# Patient Record
Sex: FEMALE | Race: WHITE | Employment: OTHER | URBAN - NONMETROPOLITAN AREA
[De-identification: names, ages, dates, MRNs, and addresses within clinical notes are randomized per-mention and may not be internally consistent; named-entity substitution may affect disease eponyms.]

---

## 2017-01-10 ENCOUNTER — PATIENT OUTREACH (OUTPATIENT)
Dept: FAMILY MEDICINE CLINIC | Facility: CLINIC | Age: 82
End: 2017-01-10

## 2017-01-16 ENCOUNTER — TELEPHONE (OUTPATIENT)
Dept: FAMILY MEDICINE CLINIC | Facility: CLINIC | Age: 82
End: 2017-01-16

## 2017-01-16 NOTE — TELEPHONE ENCOUNTER
Pt has not c/o low back pain since 12/21/2017. Melecio Sanchez said to cancel the Ua & C&S order.   ej/cj

## 2017-01-25 NOTE — TELEPHONE ENCOUNTER
Outside script request for Lozepam. Per [facility], hand written script for controlled substances need to be provided  Last filled: 11/26/2016 #6 w/ 5RF

## 2017-03-27 ENCOUNTER — MED REC SCAN ONLY (OUTPATIENT)
Dept: FAMILY MEDICINE CLINIC | Facility: CLINIC | Age: 82
End: 2017-03-27

## 2017-04-19 RX ORDER — LORAZEPAM 0.5 MG/1
TABLET ORAL
Qty: 122 TABLET | Refills: 2 | Status: SHIPPED
Start: 2017-04-19 | End: 2017-04-20

## 2017-04-20 ENCOUNTER — TELEPHONE (OUTPATIENT)
Dept: FAMILY MEDICINE CLINIC | Facility: CLINIC | Age: 82
End: 2017-04-20

## 2017-04-20 RX ORDER — LORAZEPAM 0.5 MG/1
TABLET ORAL
Qty: 120 TABLET | Refills: 0 | Status: SHIPPED
Start: 2017-04-20 | End: 2017-06-01

## 2017-04-24 ENCOUNTER — TELEPHONE (OUTPATIENT)
Dept: FAMILY MEDICINE CLINIC | Facility: CLINIC | Age: 82
End: 2017-04-24

## 2017-04-25 ENCOUNTER — MED REC SCAN ONLY (OUTPATIENT)
Dept: FAMILY MEDICINE CLINIC | Facility: CLINIC | Age: 82
End: 2017-04-25

## 2017-04-28 ENCOUNTER — TELEPHONE (OUTPATIENT)
Dept: FAMILY MEDICINE CLINIC | Facility: CLINIC | Age: 82
End: 2017-04-28

## 2017-04-28 DIAGNOSIS — E78.5 HYPERLIPIDEMIA, UNSPECIFIED HYPERLIPIDEMIA TYPE: ICD-10-CM

## 2017-04-28 DIAGNOSIS — I10 ESSENTIAL HYPERTENSION: Primary | ICD-10-CM

## 2017-05-23 ENCOUNTER — TELEPHONE (OUTPATIENT)
Dept: FAMILY MEDICINE CLINIC | Facility: CLINIC | Age: 82
End: 2017-05-23

## 2017-05-23 NOTE — TELEPHONE ENCOUNTER
Likely progression of dementia. Can do trial off Lorazepam.  If that doesn't work, need to look into long-term care facilities. V/o Dr. Sandra Hughes. Enrico Marking understanding.   States she is pulling in there now as they believe Cedric Herrera may be havin

## 2017-05-24 NOTE — TELEPHONE ENCOUNTER
Advised Kayleigh that per Dr. Aparna Leo: ok to do a trial hold of the lorazepam at night.  Kayleigh verbalized understanding of the information provided

## 2017-05-25 NOTE — TELEPHONE ENCOUNTER
After discussion with Dr Jessica Ferguson- has decided to change seroquel to 25 mg po BID. Call with update in 1 week regarding episodes of wandering, confusion,agitation. May give the HS dose of Lorazepam if needed for one week; then update to Dr Jessica Ferguson.

## 2017-05-25 NOTE — TELEPHONE ENCOUNTER
Below orders called to [place] at [facility]. Have called and left message for daughter to call office to update her as to med change.

## 2017-05-25 NOTE — TELEPHONE ENCOUNTER
Anne You at [facility] reports last night daughter requested the loreazepam be given due to agitated episode

## 2017-05-25 NOTE — TELEPHONE ENCOUNTER
Daughter calls back and reports Tuesday night was great w/o the lorazepam; but last night was not good- very distraught, ringing her hands, upset, scared, told her daughter [facility]. That's why daughter wanted lorazepam given.

## 2017-06-01 ENCOUNTER — TELEPHONE (OUTPATIENT)
Dept: FAMILY MEDICINE CLINIC | Facility: CLINIC | Age: 82
End: 2017-06-01

## 2017-06-01 RX ORDER — LORAZEPAM 0.5 MG/1
TABLET ORAL
Qty: 120 TABLET | Refills: 0 | COMMUNITY
Start: 2017-06-01 | End: 2017-12-05

## 2017-06-01 NOTE — TELEPHONE ENCOUNTER
Calls with update- since seraquel being changed to BID dosing, she has had only 1 afternoon episode of wandering on 5/28/2017. No episodes of agitation; but is falling asleep more during the day.    Currently taking lorazepam 1 mg in AM, and HS dose is PRN

## 2017-06-08 ENCOUNTER — TELEPHONE (OUTPATIENT)
Dept: FAMILY MEDICINE CLINIC | Facility: CLINIC | Age: 82
End: 2017-06-08

## 2017-06-08 NOTE — TELEPHONE ENCOUNTER
Nurse reports Mor Ashford is less sedated since making loreazepam PRN. They only gave it at HS on 6/4/2017 and 6/5/2017; 1mg. Will update Dr Angelique Ocampo. To call if any changes in behavior/agitation.

## 2017-06-15 ENCOUNTER — MED REC SCAN ONLY (OUTPATIENT)
Dept: FAMILY MEDICINE CLINIC | Facility: CLINIC | Age: 82
End: 2017-06-15

## 2017-06-30 DIAGNOSIS — F02.81 ALZHEIMER'S DEMENTIA WITH BEHAVIORAL DISTURBANCE, UNSPECIFIED TIMING OF DEMENTIA ONSET (HCC): ICD-10-CM

## 2017-06-30 DIAGNOSIS — G30.9 ALZHEIMER'S DEMENTIA WITH BEHAVIORAL DISTURBANCE, UNSPECIFIED TIMING OF DEMENTIA ONSET (HCC): ICD-10-CM

## 2017-06-30 RX ORDER — QUETIAPINE 25 MG/1
TABLET, FILM COATED ORAL
Qty: 28 TABLET | Refills: 11 | Status: SHIPPED | OUTPATIENT
Start: 2017-06-30 | End: 2020-12-01

## 2017-08-17 ENCOUNTER — TELEPHONE (OUTPATIENT)
Dept: FAMILY MEDICINE CLINIC | Facility: CLINIC | Age: 82
End: 2017-08-17

## 2017-08-17 NOTE — TELEPHONE ENCOUNTER
NEEDS TO BE TRANSFERRED FROM [facility]ISTED LIVING TO [facility]. BUT NEEDS H&P DONE PRIOR TO TRANSFER.  DOES SHE NEED TO BE SEEN? LAST OFFICE VISIT WAS 12/2016

## 2017-08-21 PROBLEM — F32.4 MAJOR DEPRESSION IN PARTIAL REMISSION (HCC): Chronic | Status: ACTIVE | Noted: 2017-08-21

## 2017-08-21 NOTE — PROGRESS NOTES
HPI:   Chelly Coleman is a 81year old female who presents for a MA (Medicare Advantage) Supervisit (Once per calendar year).     Pt going into memory unit NH  Her last annual assessment has been over 1 year: Annual Physical due on 04/30/2017         Rich mouth daily. aspirin 81 MG Oral Tab EC Take 1 tablet (81 mg total) by mouth daily. Colestipol HCl 1 G Oral Tab Take 1 tablet (1 g total) by mouth 2 (two) times daily. Memantine HCl 5 MG Oral Tab Take 1 tablet (5 mg total) by mouth nightly.    omeprazo discharge or itching, no complaint of urinary incontinence   MUSCULOSKELETAL: denies back pain  NEURO: denies headaches  PSYCHE: denies depression or anxiety  HEMATOLOGIC: denies hx of anemia  ENDOCRINE: denies thyroid history  ALL/ASTHMA: denies hx of all Pneumococcal, Zoster, Tetanus     Immunization History   Administered Date(s) Administered   • Influenza Vaccine, High Dose, Preserv Free 09/27/2015, 10/20/2016   • Pneumococcal (Prevnar 13) 04/30/2016       ASSESSMENT AND OTHER RELEVANT CHRONIC CONDITIONS assessment: good     Advanced Directive:  Living Will on file in [facility]? Chelly Coleman does not have a Living Will on file in [facility]  The patient has this document but we do not have it in [facility], and patient is instructed to get our office a copy of it for s accidently lose urine?: 0-No    Do you have difficulty seeing?: 0-No    Do you have any difficulty walking or getting up?: 0-No    Do you have any tripping hazards?: 0-No    Are you on multiple medications?: 1-Yes    Does pain affect your day to day Gal[facility] Screening      Ophthalmology Visit Annually: Diabetics, FHx Glaucoma, AA>50, > 65 Data entered on: 1/16/2016   Last Dilated Eye Exam 12/30/2015       Bone Density Screening      Dexascan Every two years No results found for this or any previous vis diuretics, anticonvulsants.)    Potassium  Annually Potassium (mmol/L)   Date Value   04/30/2016 4.4    No flowsheet data found. Creatinine  Annually Creatinine (mg/dL)   Date Value   04/30/2016 1.21 (H)    No flowsheet data found.     BUN  Annually BUN

## 2017-08-21 NOTE — PATIENT INSTRUCTIONS
Tiffany Pedroza's SCREENING SCHEDULE   Tests on this list are recommended by your physician but may not be covered, or covered at this frequency, by your insurer. Please check with your insurance carrier before scheduling to verify coverage.    PREVENTATI often if abnormal There are no preventive care reminders to display for this patient. Update Health Maintenance if applicable    Flex Sigmoidoscopy Screen  Covered every 5 years No results found for this or any previous visit. No flowsheet data found. KAREN IM    Please get once after your 65th birthday    Pneumococcal 23 (Pneumovax)  Covered Once after 65 No orders found for this or any previous visit.  Please get once after your 65th birthday    Hepatitis B for Moderate/High Risk       No orders found fo

## 2017-09-05 ENCOUNTER — MED REC SCAN ONLY (OUTPATIENT)
Dept: FAMILY MEDICINE CLINIC | Facility: CLINIC | Age: 82
End: 2017-09-05

## 2017-10-16 ENCOUNTER — MED REC SCAN ONLY (OUTPATIENT)
Dept: FAMILY MEDICINE CLINIC | Facility: CLINIC | Age: 82
End: 2017-10-16

## 2017-11-02 ENCOUNTER — PATIENT OUTREACH (OUTPATIENT)
Dept: FAMILY MEDICINE CLINIC | Facility: CLINIC | Age: 82
End: 2017-11-02

## 2017-12-01 NOTE — TELEPHONE ENCOUNTER
Ken Alvarado Nurse   Caller: Jocy Early at [place] at [facility] (Today,  2:59 PM)      [address]   [phone]      Fax request received from [facility] requesting refill for Lorazepam.  Need to confirm who is attendin

## 2017-12-05 RX ORDER — LORAZEPAM 0.5 MG/1
TABLET ORAL
Qty: 60 TABLET | Refills: 0 | Status: SHIPPED
Start: 2017-12-05 | End: 2018-01-19

## 2017-12-05 NOTE — TELEPHONE ENCOUNTER
Refaxed [facility] form for Lorazepam 0.5 mg tab script. Due to strength of tab being not on form.   Routed to Dr Angelique Ocampo to sign lorazepam script

## 2017-12-06 ENCOUNTER — MED REC SCAN ONLY (OUTPATIENT)
Dept: FAMILY MEDICINE CLINIC | Facility: CLINIC | Age: 82
End: 2017-12-06

## 2017-12-09 ENCOUNTER — OFFICE VISIT (OUTPATIENT)
Dept: FAMILY MEDICINE CLINIC | Facility: CLINIC | Age: 82
End: 2017-12-09

## 2017-12-09 VITALS
WEIGHT: 157.38 LBS | HEART RATE: 81 BPM | OXYGEN SATURATION: 97 % | BODY MASS INDEX: 29 KG/M2 | SYSTOLIC BLOOD PRESSURE: 120 MMHG | DIASTOLIC BLOOD PRESSURE: 70 MMHG | TEMPERATURE: 98 F

## 2017-12-09 DIAGNOSIS — I10 ESSENTIAL HYPERTENSION WITH GOAL BLOOD PRESSURE LESS THAN 140/90: ICD-10-CM

## 2017-12-09 DIAGNOSIS — N18.30 CKD (CHRONIC KIDNEY DISEASE) STAGE 3, GFR 30-59 ML/MIN (HCC): ICD-10-CM

## 2017-12-09 DIAGNOSIS — K21.9 GASTROESOPHAGEAL REFLUX DISEASE WITHOUT ESOPHAGITIS: ICD-10-CM

## 2017-12-09 DIAGNOSIS — G30.8 ALZHEIMER'S DISEASE OF OTHER ONSET WITHOUT BEHAVIORAL DISTURBANCE: ICD-10-CM

## 2017-12-09 DIAGNOSIS — E55.9 VITAMIN D DEFICIENCY: ICD-10-CM

## 2017-12-09 DIAGNOSIS — I87.2 VENOUS INSUFFICIENCY OF BOTH LOWER EXTREMITIES: ICD-10-CM

## 2017-12-09 DIAGNOSIS — I77.9 BILATERAL CAROTID ARTERY DISEASE (HCC): ICD-10-CM

## 2017-12-09 DIAGNOSIS — F02.80 ALZHEIMER'S DISEASE OF OTHER ONSET WITHOUT BEHAVIORAL DISTURBANCE: ICD-10-CM

## 2017-12-09 DIAGNOSIS — D51.0 ANEMIA, PERNICIOUS: Primary | ICD-10-CM

## 2017-12-09 DIAGNOSIS — I10 ESSENTIAL HYPERTENSION: ICD-10-CM

## 2017-12-09 DIAGNOSIS — E78.5 HYPERLIPIDEMIA, UNSPECIFIED HYPERLIPIDEMIA TYPE: ICD-10-CM

## 2017-12-09 DIAGNOSIS — R41.3 MEMORY LOSS OF UNKNOWN CAUSE: ICD-10-CM

## 2017-12-09 PROCEDURE — 82607 VITAMIN B-12: CPT | Performed by: FAMILY MEDICINE

## 2017-12-09 PROCEDURE — 80053 COMPREHEN METABOLIC PANEL: CPT | Performed by: FAMILY MEDICINE

## 2017-12-09 PROCEDURE — 36415 COLL VENOUS BLD VENIPUNCTURE: CPT | Performed by: FAMILY MEDICINE

## 2017-12-09 PROCEDURE — 99214 OFFICE O/P EST MOD 30 MIN: CPT | Performed by: FAMILY MEDICINE

## 2017-12-09 PROCEDURE — 85025 COMPLETE CBC W/AUTO DIFF WBC: CPT | Performed by: FAMILY MEDICINE

## 2017-12-09 PROCEDURE — 82746 ASSAY OF FOLIC ACID SERUM: CPT | Performed by: FAMILY MEDICINE

## 2017-12-09 PROCEDURE — 82306 VITAMIN D 25 HYDROXY: CPT | Performed by: FAMILY MEDICINE

## 2017-12-09 RX ORDER — AMLODIPINE BESYLATE 2.5 MG/1
2.5 TABLET ORAL DAILY
COMMUNITY
Start: 2016-06-18 | End: 2020-10-01

## 2017-12-09 RX ORDER — MEMANTINE HYDROCHLORIDE 5 MG/1
1 TABLET ORAL NIGHTLY
COMMUNITY
Start: 2015-10-02 | End: 2020-08-27

## 2017-12-09 RX ORDER — FUROSEMIDE 40 MG/1
TABLET ORAL
Qty: 30 TABLET | Refills: 3 | COMMUNITY
Start: 2017-12-09 | End: 2020-09-29

## 2017-12-09 NOTE — PATIENT INSTRUCTIONS
Medications as directed. Elevate legs. Massage feet and lower legs. Continue current medications. Call with questions or problems. Recheck basic metabolic panel in 2 weeks.

## 2017-12-09 NOTE — PROGRESS NOTES
HPI:    Patient ID: Cassandra Griffith is a 81year old female. Daughter concerned with swelling to lower extremities left greater than right. Not giving water pill at nursing home. Without problems with medications. Positive weight gain. No TATY hose.   Venida Lamp 500 MG Oral Tab Take 1 tablet (500 mg total) by mouth every 4 (four) hours as needed for Pain.  Disp: 100 tablet Rfl: 2     Allergies:  Adhesive Tape               Comment:AND WOULD DRESSINGS  Compazine [Prochlor*      Dihydroergotamine         Imitrex [Sum supplementation. Await laboratory studies.  - COMP METABOLIC PANEL (14); Future  - CBC WITH DIFFERENTIAL WITH PLATELET;  Future  - VITAMIN D, 25-HYDROXY; Future  - COMP METABOLIC PANEL (14)  - CBC WITH DIFFERENTIAL WITH PLATELET  - VITAMIN D, 25-HYDROXY  - D, 25-Hydroxy [E]      Vitamin G01 [E]      Folic Acid Serum [E]    Meds This Visit:  No prescriptions requested or ordered in this encounter       Imaging & Referrals:  None       IV#[id number]

## 2017-12-12 DIAGNOSIS — D51.0 ANEMIA, PERNICIOUS: ICD-10-CM

## 2017-12-12 DIAGNOSIS — E55.9 VITAMIN D DEFICIENCY: Primary | ICD-10-CM

## 2017-12-12 RX ORDER — FOLIC ACID 1 MG/1
1 TABLET ORAL DAILY
Qty: 90 TABLET | Refills: 3 | COMMUNITY
Start: 2017-12-12 | End: 2018-05-29 | Stop reason: ALTCHOICE

## 2017-12-12 RX ORDER — CHOLECALCIFEROL (VITAMIN D3) 125 MCG
1 CAPSULE ORAL DAILY
Qty: 90 TABLET | Refills: 3 | COMMUNITY
Start: 2017-12-12 | End: 2020-09-29

## 2017-12-12 RX ORDER — CHOLECALCIFEROL (VITAMIN D3) 125 MCG
500 CAPSULE ORAL DAILY
Qty: 90 TABLET | Refills: 3 | COMMUNITY
Start: 2017-12-12 | End: 2019-10-15

## 2018-01-03 ENCOUNTER — TELEPHONE (OUTPATIENT)
Dept: FAMILY MEDICINE CLINIC | Facility: CLINIC | Age: 83
End: 2018-01-03

## 2018-01-05 NOTE — TELEPHONE ENCOUNTER
REGARDING REFILL CYANOCOBALAMIN 1000 MCG/ML. RESIDENT REFUSED LAST INJECTION, AND IS CURRENTLY ON VITAMIN B12 500 MCG TAB DAILY. CAN WE D/C THEMONTHLY INJECTION? ADVISED- ORDER FAXED MAY D/C B12 INJECTION.   EJ/CJ

## 2018-01-10 ENCOUNTER — TELEPHONE (OUTPATIENT)
Dept: FAMILY MEDICINE CLINIC | Facility: CLINIC | Age: 83
End: 2018-01-10

## 2018-01-10 NOTE — TELEPHONE ENCOUNTER
Looking for an order for nose swab and if negative is there any kind of prophylactic needed? If Positive, tamaflu?  Please fax order to [phone]

## 2018-01-10 NOTE — TELEPHONE ENCOUNTER
Ayaka Ha called stating that patient started with fatigue and low grade fever yesterday. Temperature 99.4. Decrease in appetite. No cough or respiratory distress. Nurse is asking if a flu swab should be ordered.   There has been an outbreak of flu in the nu

## 2018-01-10 NOTE — TELEPHONE ENCOUNTER
Ok per Dr. Alisia Lobo to order flu swab. If positive, tamiflu 75mg BID x 5 days. If negative and patient wishes to do prophylatic, tamiflu 75mg daily x 10 days. Left message for Keen IO to return phone call.

## 2018-01-12 ENCOUNTER — PATIENT OUTREACH (OUTPATIENT)
Dept: FAMILY MEDICINE CLINIC | Facility: CLINIC | Age: 83
End: 2018-01-12

## 2018-01-13 ENCOUNTER — TELEPHONE (OUTPATIENT)
Dept: FAMILY MEDICINE CLINIC | Facility: CLINIC | Age: 83
End: 2018-01-13

## 2018-01-13 DIAGNOSIS — I10 ESSENTIAL HYPERTENSION: Primary | ICD-10-CM

## 2018-01-13 DIAGNOSIS — T50.2X5D DIURETICS CAUSING ADVERSE EFFECT IN THERAPEUTIC USE, SUBSEQUENT ENCOUNTER: ICD-10-CM

## 2018-01-13 NOTE — TELEPHONE ENCOUNTER
ROBIN WANTS TO MAKE SURE THERE IS AN ORDER TO RECHECK KIDNEY FUNCTION. ALSO, [facility]   PLEASE CALL [employer] CELL PHONE,  IF MONDAY, REST OF THE WEEK, PLEASE CALL WORK #

## 2018-01-15 ENCOUNTER — TELEPHONE (OUTPATIENT)
Dept: FAMILY MEDICINE CLINIC | Facility: CLINIC | Age: 83
End: 2018-01-15

## 2018-01-15 NOTE — TELEPHONE ENCOUNTER
DEC 9 LAST LABS; NEXT ORDERED FOR April; DO YOU WANT SOONER?    UPON FURTHER REVIEW OF CHART BMP WAS DUE BEGINNING OF JAN; FAX ORDER TO FACILITY; OK?

## 2018-01-16 RX ORDER — DEXTROMETHORPHAN POLISTIREX 30 MG/5ML
5 SUSPENSION ORAL EVERY 12 HOURS PRN
Refills: 0 | COMMUNITY
Start: 2018-01-16 | End: 2018-07-17 | Stop reason: ALTCHOICE

## 2018-01-16 NOTE — TELEPHONE ENCOUNTER
Resident is currently taking Tamiflu. Has occasional harsh non-productive cough. Can we have something PRN for cough/congestion?   Advised- Faxed back order for Delsym 1 tsp po q 12 hrs PRN   ej/cj

## 2018-01-19 RX ORDER — LORAZEPAM 0.5 MG/1
TABLET ORAL
Qty: 60 TABLET | Refills: 0 | Status: SHIPPED
Start: 2018-01-19 | End: 2018-08-27

## 2018-02-01 ENCOUNTER — PATIENT OUTREACH (OUTPATIENT)
Dept: FAMILY MEDICINE CLINIC | Facility: CLINIC | Age: 83
End: 2018-02-01

## 2018-03-12 ENCOUNTER — TELEPHONE (OUTPATIENT)
Dept: FAMILY MEDICINE CLINIC | Facility: CLINIC | Age: 83
End: 2018-03-12

## 2018-03-12 NOTE — TELEPHONE ENCOUNTER
Per Josephine Fonseca- they didn't receive BMP order for in Jan; they will get drawn tomorrow morning. Daughter gave her one of her zofran yesterday for nausea and it helped. Asking for prn order.       She currently is on lasix 40 mg q AM.  It has been increased to B

## 2018-03-12 NOTE — TELEPHONE ENCOUNTER
PER EMMANUEL NAUSEA WAS JUST YESTERDAY- SHE DID VOMIT TWICE. TODAY NO COMPLAINTS.   SHE WEARS NO SUPPORT STOCKINGS

## 2018-03-12 NOTE — TELEPHONE ENCOUNTER
Question support stockings daily. Question how long has nausea been an issue. Question related to medications.

## 2018-03-13 ENCOUNTER — TELEPHONE (OUTPATIENT)
Dept: FAMILY MEDICINE CLINIC | Facility: CLINIC | Age: 83
End: 2018-03-13

## 2018-05-26 ENCOUNTER — OFFICE VISIT (OUTPATIENT)
Dept: FAMILY MEDICINE CLINIC | Facility: CLINIC | Age: 83
End: 2018-05-26

## 2018-05-26 VITALS
TEMPERATURE: 100 F | HEART RATE: 76 BPM | DIASTOLIC BLOOD PRESSURE: 80 MMHG | BODY MASS INDEX: 28.04 KG/M2 | OXYGEN SATURATION: 96 % | WEIGHT: 152.38 LBS | HEIGHT: 62 IN | SYSTOLIC BLOOD PRESSURE: 134 MMHG

## 2018-05-26 DIAGNOSIS — I77.9 BILATERAL CAROTID ARTERY DISEASE (HCC): ICD-10-CM

## 2018-05-26 DIAGNOSIS — F32.4 MAJOR DEPRESSIVE DISORDER WITH SINGLE EPISODE, IN PARTIAL REMISSION (HCC): ICD-10-CM

## 2018-05-26 DIAGNOSIS — F02.80 ALZHEIMER'S DISEASE OF OTHER ONSET WITHOUT BEHAVIORAL DISTURBANCE: ICD-10-CM

## 2018-05-26 DIAGNOSIS — I10 ESSENTIAL HYPERTENSION: ICD-10-CM

## 2018-05-26 DIAGNOSIS — I87.2 VENOUS INSUFFICIENCY OF BOTH LOWER EXTREMITIES: ICD-10-CM

## 2018-05-26 DIAGNOSIS — E55.9 VITAMIN D DEFICIENCY: ICD-10-CM

## 2018-05-26 DIAGNOSIS — D51.0 ANEMIA, PERNICIOUS: ICD-10-CM

## 2018-05-26 DIAGNOSIS — E78.5 HYPERLIPIDEMIA, UNSPECIFIED HYPERLIPIDEMIA TYPE: ICD-10-CM

## 2018-05-26 DIAGNOSIS — Z00.00 ENCOUNTER FOR ANNUAL HEALTH EXAMINATION: ICD-10-CM

## 2018-05-26 DIAGNOSIS — G30.8 ALZHEIMER'S DISEASE OF OTHER ONSET WITHOUT BEHAVIORAL DISTURBANCE: ICD-10-CM

## 2018-05-26 DIAGNOSIS — K21.9 GASTROESOPHAGEAL REFLUX DISEASE, ESOPHAGITIS PRESENCE NOT SPECIFIED: Primary | ICD-10-CM

## 2018-05-26 PROCEDURE — 82306 VITAMIN D 25 HYDROXY: CPT | Performed by: FAMILY MEDICINE

## 2018-05-26 PROCEDURE — 82746 ASSAY OF FOLIC ACID SERUM: CPT | Performed by: FAMILY MEDICINE

## 2018-05-26 PROCEDURE — G0439 PPPS, SUBSEQ VISIT: HCPCS | Performed by: FAMILY MEDICINE

## 2018-05-26 PROCEDURE — 80053 COMPREHEN METABOLIC PANEL: CPT | Performed by: FAMILY MEDICINE

## 2018-05-26 PROCEDURE — 82607 VITAMIN B-12: CPT | Performed by: FAMILY MEDICINE

## 2018-05-26 PROCEDURE — 96160 PT-FOCUSED HLTH RISK ASSMT: CPT | Performed by: FAMILY MEDICINE

## 2018-05-26 PROCEDURE — 99397 PER PM REEVAL EST PAT 65+ YR: CPT | Performed by: FAMILY MEDICINE

## 2018-05-26 PROCEDURE — 85025 COMPLETE CBC W/AUTO DIFF WBC: CPT | Performed by: FAMILY MEDICINE

## 2018-05-26 NOTE — PATIENT INSTRUCTIONS
Soak feet q d x 5 min  meds as directed  vicks q hs 1st nail  Reg exercise  CCC    Rody Pedroza's SCREENING SCHEDULE   Tests on this list are recommended by your physician but may not be covered, or covered at this frequency, by your insurer.  Please sean Screening  Covered up to Age 76     Colonoscopy Screen   Covered every 10 years- more often if abnormal There are no preventive care reminders to display for this patient.  Update Health Maintenance if applicable    Flex Sigmoidoscopy Screen  Covered every Pneumococcal 13 (Prevnar)  Covered Once after 65   Orders placed or performed in visit on 04/30/16  -PNEUMOCOCCAL VACC, 13 KAREN IM    Please get once after your 65th birthday    Pneumococcal 23 (Pneumovax)  Covered Once after 65 No orders found for this or

## 2018-05-26 NOTE — PROGRESS NOTES
HPI:   Elis Ivy is a 81year old female who presents for a MA (Medicare Advantage) Supervisit (Once per calendar year).       Annual Physical due on 08/21/2018        Fall/Risk Assessment abnormal    She has been screened for Falls and is High Risk: for groceries: Cannot do without help   She has difficulties Taking Meds as Rx'd based on screening of functional status. Taking medications as prescribed: Cannot do without help      She has Hearing problems based on screening of functional status.    He 04/30/2016          Last Chemistry Labs:     Lab Results  Component Value Date   AST 11 (L) 12/09/2017   ALT 16 12/09/2017   CA 9.0 12/09/2017   ALB 3.2 (L) 12/09/2017   TSH 2.330 11/28/2014   CREATSERUM 1.18 (H) 12/09/2017    (H) 12/09/2017 daily. PRN for allergy symptoms   Memantine HCl (NAMENDA) 10 MG Oral Tab Take 1 tablet (10 mg total) by mouth every morning.    acetaminophen (TYLENOL EX ST ARTHRITIS PAIN) 500 MG Oral Tab Take 1 tablet (500 mg total) by mouth every 4 (four) hours as needed Normocephalic, without obvious abnormality, atraumatic   Eyes:  PERRL, conjunctiva/corneas clear, EOM's intact both eyes   Ears:  Normal TM's and external ear canals, both ears   Nose: Nares normal, septum midline,mucosa normal, no drainage or sinus tender FOLIC ACID SERUM(FOLATE)  -     VITAMIN B12  -     VITAMIN D, 25-HYDROXY  -     CBC W/ DIFFERENTIAL    Major depressive disorder with single episode, in partial remission ([facility] [address]    Bilateral carotid artery disease (HCC)    Venous insufficiency of both lower In the past six months, have you lost more than 10 pounds without trying?: 2 - No  Has your appetite been poor?: No (lower appetite)  How does the patient maintain a good energy level?: Stretching; Other (sleeping poor)  How would you describe your daily Chlamydia  Annually if high risk No results found for: CHLAMYDIA No flowsheet data found. Screening Mammogram      Mammogram Annually to 76, then as discussed There are no preventive care reminders to display for this patient.  Update [facility]

## 2018-05-29 DIAGNOSIS — E78.5 HYPERLIPIDEMIA, UNSPECIFIED HYPERLIPIDEMIA TYPE: ICD-10-CM

## 2018-05-29 DIAGNOSIS — D51.0 ANEMIA, PERNICIOUS: Primary | ICD-10-CM

## 2018-05-29 DIAGNOSIS — I10 ESSENTIAL HYPERTENSION: ICD-10-CM

## 2018-05-29 DIAGNOSIS — E55.9 VITAMIN D DEFICIENCY: ICD-10-CM

## 2018-07-07 ENCOUNTER — MED REC SCAN ONLY (OUTPATIENT)
Dept: FAMILY MEDICINE CLINIC | Facility: CLINIC | Age: 83
End: 2018-07-07

## 2018-07-15 ENCOUNTER — APPOINTMENT (OUTPATIENT)
Dept: GENERAL RADIOLOGY | Facility: HOSPITAL | Age: 83
End: 2018-07-15
Attending: EMERGENCY MEDICINE
Payer: MEDICARE

## 2018-07-15 ENCOUNTER — APPOINTMENT (OUTPATIENT)
Dept: CT IMAGING | Facility: HOSPITAL | Age: 83
End: 2018-07-15
Attending: EMERGENCY MEDICINE
Payer: MEDICARE

## 2018-07-15 ENCOUNTER — HOSPITAL ENCOUNTER (EMERGENCY)
Facility: HOSPITAL | Age: 83
Discharge: HOME | End: 2018-07-15
Attending: EMERGENCY MEDICINE
Payer: MEDICARE

## 2018-07-15 ENCOUNTER — HOSPITAL ENCOUNTER (OUTPATIENT)
Age: 83
Discharge: ANOTHER HOSPITAL | End: 2018-07-15
Attending: FAMILY MEDICINE
Payer: MEDICARE

## 2018-07-15 ENCOUNTER — APPOINTMENT (OUTPATIENT)
Dept: GENERAL RADIOLOGY | Age: 83
End: 2018-07-15
Attending: FAMILY MEDICINE
Payer: MEDICARE

## 2018-07-15 VITALS
HEART RATE: 78 BPM | RESPIRATION RATE: 21 BRPM | HEIGHT: 61 IN | BODY MASS INDEX: 28.76 KG/M2 | WEIGHT: 152.31 LBS | DIASTOLIC BLOOD PRESSURE: 58 MMHG | SYSTOLIC BLOOD PRESSURE: 170 MMHG | OXYGEN SATURATION: 94 % | TEMPERATURE: 99 F

## 2018-07-15 VITALS
DIASTOLIC BLOOD PRESSURE: 73 MMHG | RESPIRATION RATE: 16 BRPM | OXYGEN SATURATION: 97 % | SYSTOLIC BLOOD PRESSURE: 152 MMHG | TEMPERATURE: 98 F | HEART RATE: 77 BPM

## 2018-07-15 DIAGNOSIS — G30.8 ALZHEIMER'S DISEASE OF OTHER ONSET WITHOUT BEHAVIORAL DISTURBANCE: ICD-10-CM

## 2018-07-15 DIAGNOSIS — R79.89 ELEVATED D-DIMER: ICD-10-CM

## 2018-07-15 DIAGNOSIS — H10.33 ACUTE CONJUNCTIVITIS OF BOTH EYES, UNSPECIFIED ACUTE CONJUNCTIVITIS TYPE: ICD-10-CM

## 2018-07-15 DIAGNOSIS — F02.80 ALZHEIMER'S DISEASE OF OTHER ONSET WITHOUT BEHAVIORAL DISTURBANCE: ICD-10-CM

## 2018-07-15 DIAGNOSIS — R41.82 ALTERED MENTAL STATUS, UNSPECIFIED ALTERED MENTAL STATUS TYPE: ICD-10-CM

## 2018-07-15 DIAGNOSIS — M54.9 UPPER BACK PAIN: ICD-10-CM

## 2018-07-15 DIAGNOSIS — K44.9 HIATAL HERNIA: Primary | ICD-10-CM

## 2018-07-15 DIAGNOSIS — R06.02 SHORTNESS OF BREATH: Primary | ICD-10-CM

## 2018-07-15 LAB
#LYMPHOCYTE IC: 2.3 X10ˆ3/UL (ref 0.9–3.2)
#MXD IC: 0.9 X10ˆ3/UL (ref 0.1–1)
#NEUTROPHIL IC: 5.2 X10ˆ3/UL (ref 1.3–6.7)
ALBUMIN SERPL-MCNC: 3.2 G/DL (ref 3.5–4.8)
ALP LIVER SERPL-CCNC: 94 U/L (ref 55–142)
ALT SERPL-CCNC: 18 U/L (ref 14–54)
AST SERPL-CCNC: 11 U/L (ref 15–41)
BASOPHILS # BLD AUTO: 0.06 X10(3) UL (ref 0–0.1)
BASOPHILS NFR BLD AUTO: 0.7 %
BILIRUB SERPL-MCNC: 0.3 MG/DL (ref 0.1–2)
BILIRUB UR QL STRIP.AUTO: NEGATIVE
BUN BLD-MCNC: 28 MG/DL (ref 8–20)
CALCIUM BLD-MCNC: 9.3 MG/DL (ref 8.3–10.3)
CHLORIDE: 103 MMOL/L (ref 101–111)
CLARITY UR REFRACT.AUTO: CLEAR
CO2: 28 MMOL/L (ref 22–32)
COLOR UR AUTO: YELLOW
CREAT BLD-MCNC: 1.28 MG/DL (ref 0.55–1.02)
CREAT SERPL-MCNC: 1.3 MG/DL (ref 0.55–1.02)
DDIMER WHOLE BLOOD: 1280 NG/ML DDU (ref ?–400)
EOSINOPHIL # BLD AUTO: 0.32 X10(3) UL (ref 0–0.3)
EOSINOPHIL NFR BLD AUTO: 3.7 %
ERYTHROCYTE [DISTWIDTH] IN BLOOD BY AUTOMATED COUNT: 14.2 % (ref 11.5–16)
GLUCOSE BLD-MCNC: 105 MG/DL (ref 70–99)
GLUCOSE BLD-MCNC: 93 MG/DL (ref 70–99)
GLUCOSE UR STRIP.AUTO-MCNC: NEGATIVE MG/DL
HCT IC: 34.9 % (ref 37–54)
HCT VFR BLD AUTO: 33.4 % (ref 34–50)
HGB BLD-MCNC: 10.6 G/DL (ref 12–16)
HGB IC: 11.7 G/DL (ref 11.7–16)
HYALINE CASTS #/AREA URNS AUTO: PRESENT /LPF
IMMATURE GRANULOCYTE COUNT: 0.04 X10(3) UL (ref 0–1)
IMMATURE GRANULOCYTE RATIO %: 0.5 %
ISTAT BLOOD GAS TCO2: 29 MMOL/L (ref 22–32)
ISTAT BUN: 29 MG/DL (ref 8–20)
ISTAT CHLORIDE: 103 MMOL/L (ref 101–111)
ISTAT HEMATOCRIT: 34 % (ref 34–50)
ISTAT IONIZED CALCIUM: 1.13 MMOL/L
ISTAT POTASSIUM: 4.2 MMOL/L (ref 3.6–5.1)
ISTAT SODIUM: 140 MMOL/L (ref 136–144)
ISTAT TROPONIN: <0.1 NG/ML (ref ?–0.1)
KETONES UR STRIP.AUTO-MCNC: NEGATIVE MG/DL
LYMPHOCYTES # BLD AUTO: 2.73 X10(3) UL (ref 0.9–4)
LYMPHOCYTES NFR BLD AUTO: 27.7 %
LYMPHOCYTES NFR BLD AUTO: 31.2 %
M PROTEIN MFR SERPL ELPH: 7.3 G/DL (ref 6.1–8.3)
MCH IC: 30.7 PG (ref 27–33.2)
MCH RBC QN AUTO: 29.4 PG (ref 27–33.2)
MCHC IC: 33.5 G/DL (ref 31–37)
MCHC RBC AUTO-ENTMCNC: 31.7 G/DL (ref 31–37)
MCV IC: 91.6 FL (ref 81–100)
MCV RBC AUTO: 92.8 FL (ref 81–100)
MIXED CELL %: 10.3 %
MONOCYTES # BLD AUTO: 0.84 X10(3) UL (ref 0.1–1)
MONOCYTES NFR BLD AUTO: 9.6 %
NEUTROPHIL ABS PRELIM: 4.75 X10 (3) UL (ref 1.3–6.7)
NEUTROPHILS # BLD AUTO: 4.75 X10(3) UL (ref 1.3–6.7)
NEUTROPHILS NFR BLD AUTO: 54.3 %
NEUTROPHILS NFR BLD AUTO: 62 %
NITRITE UR QL STRIP.AUTO: NEGATIVE
PH UR STRIP.AUTO: 6 [PH] (ref 4.5–8)
PLATELET # BLD AUTO: 197 10(3)UL (ref 150–450)
PLT IC: 220 X10ˆ3/UL (ref 150–450)
POCT BILIRUBIN URINE: NEGATIVE
POCT BLOOD URINE: NEGATIVE
POCT GLUCOSE URINE: NEGATIVE MG/DL
POCT KETONE URINE: NEGATIVE MG/DL
POCT NITRITE URINE: NEGATIVE
POCT PH URINE: 6.5 (ref 5–8)
POCT PROTEIN URINE: NEGATIVE MG/DL
POCT SPECIFIC GRAVITY URINE: 1.01
POCT URINE CLARITY: CLEAR
POCT URINE COLOR: YELLOW
POCT UROBILINOGEN URINE: 0.2 MG/DL
POTASSIUM SERPL-SCNC: 3.7 MMOL/L (ref 3.6–5.1)
PROCALCITONIN SERPL-MCNC: <0.11 NG/ML
PROT UR STRIP.AUTO-MCNC: NEGATIVE MG/DL
RBC # BLD AUTO: 3.6 X10(6)UL (ref 3.8–5.1)
RBC IC: 3.81 X10ˆ6/UL (ref 3.8–5.1)
RBC UR QL AUTO: NEGATIVE
RED CELL DISTRIBUTION WIDTH-SD: 48.5 FL (ref 35.1–46.3)
SODIUM SERPL-SCNC: 140 MMOL/L (ref 136–144)
SP GR UR STRIP.AUTO: 1.01 (ref 1–1.03)
UROBILINOGEN UR STRIP.AUTO-MCNC: <2 MG/DL
WBC # BLD AUTO: 8.7 X10(3) UL (ref 4–13)
WBC IC: 8.4 X10ˆ3/UL (ref 4–13)

## 2018-07-15 PROCEDURE — 99215 OFFICE O/P EST HI 40 MIN: CPT

## 2018-07-15 PROCEDURE — 85025 COMPLETE CBC W/AUTO DIFF WBC: CPT | Performed by: FAMILY MEDICINE

## 2018-07-15 PROCEDURE — 93010 ELECTROCARDIOGRAM REPORT: CPT

## 2018-07-15 PROCEDURE — 85025 COMPLETE CBC W/AUTO DIFF WBC: CPT | Performed by: EMERGENCY MEDICINE

## 2018-07-15 PROCEDURE — 80053 COMPREHEN METABOLIC PANEL: CPT | Performed by: EMERGENCY MEDICINE

## 2018-07-15 PROCEDURE — 84484 ASSAY OF TROPONIN QUANT: CPT

## 2018-07-15 PROCEDURE — 99285 EMERGENCY DEPT VISIT HI MDM: CPT

## 2018-07-15 PROCEDURE — 85378 FIBRIN DEGRADE SEMIQUANT: CPT | Performed by: FAMILY MEDICINE

## 2018-07-15 PROCEDURE — 80047 BASIC METABLC PNL IONIZED CA: CPT

## 2018-07-15 PROCEDURE — 99204 OFFICE O/P NEW MOD 45 MIN: CPT

## 2018-07-15 PROCEDURE — 71046 X-RAY EXAM CHEST 2 VIEWS: CPT | Performed by: FAMILY MEDICINE

## 2018-07-15 PROCEDURE — 87086 URINE CULTURE/COLONY COUNT: CPT | Performed by: FAMILY MEDICINE

## 2018-07-15 PROCEDURE — 71045 X-RAY EXAM CHEST 1 VIEW: CPT | Performed by: EMERGENCY MEDICINE

## 2018-07-15 PROCEDURE — 36415 COLL VENOUS BLD VENIPUNCTURE: CPT

## 2018-07-15 PROCEDURE — 93005 ELECTROCARDIOGRAM TRACING: CPT

## 2018-07-15 PROCEDURE — 81001 URINALYSIS AUTO W/SCOPE: CPT | Performed by: EMERGENCY MEDICINE

## 2018-07-15 PROCEDURE — 84145 PROCALCITONIN (PCT): CPT | Performed by: EMERGENCY MEDICINE

## 2018-07-15 PROCEDURE — 81002 URINALYSIS NONAUTO W/O SCOPE: CPT | Performed by: FAMILY MEDICINE

## 2018-07-15 PROCEDURE — 71275 CT ANGIOGRAPHY CHEST: CPT | Performed by: EMERGENCY MEDICINE

## 2018-07-15 RX ORDER — TOBRAMYCIN 3 MG/ML
1 SOLUTION/ DROPS OPHTHALMIC EVERY 6 HOURS
Qty: 1 BOTTLE | Refills: 0 | Status: SHIPPED | OUTPATIENT
Start: 2018-07-15 | End: 2018-07-15

## 2018-07-15 RX ORDER — TOBRAMYCIN 3 MG/ML
1 SOLUTION/ DROPS OPHTHALMIC EVERY 6 HOURS
Qty: 1 BOTTLE | Refills: 0 | Status: SHIPPED | OUTPATIENT
Start: 2018-07-15 | End: 2018-07-17 | Stop reason: ALTCHOICE

## 2018-07-15 NOTE — ED PROVIDER NOTES
Patient Seen in: [id number] [facility]    History   Patient presents with:  Eye Problem  Dyspnea AMPARO SOB (respiratory)  Altered Mental Status (neurologic)    Stated Complaint: Eye Irritation x 2 Days    HPI    59-year-old female with a history systems are as noted in HPI. Constitutional and vital signs reviewed. All other systems reviewed and negative except as noted above.     Physical Exam   ED Triage Vitals [07/15/18 1118]  BP: 118/66  Pulse: 73  Resp: 16  Temp: 97.4 °F (36.3 °C)  Temp s Leukocyte esterase urine Small (*)     All other components within normal limits   D-DIMER (POC) - Abnormal; Notable for the following:     D-Dimer DDU 1,280 (*)     All other components within normal limits   POCT ISTAT CHEM8 CARTRIDGE - Abnormal; Notable cells.  Urine culture ordered  Chest x-ray was negative. Upon was negative. D-dimer elevated at 1280. With recent onset altered mental status/confusion, shortness of breath and pain in the back these symptoms are concerning for pulmonary embolism.   John

## 2018-07-15 NOTE — ED INITIAL ASSESSMENT (HPI)
Daughter sts informed today pt has had itching and watery eyes for the past several days. Noted more confusion than normal today. Has noted intermittent SOB with activity for the past 2 weeks. Today c/o pain to back when SOB.  Living in [facility] Care[facility]isted

## 2018-07-15 NOTE — ED INITIAL ASSESSMENT (HPI)
Pt presents to the ED to the ED accompanied by family with complaints of intermittent shortness of breath. Pt was seen at [facility] today for conjunctivitis, and they ran a d-dimer, was elevated, so sent here for further evaluation.  Per daughter, sats were 90-91

## 2018-07-16 ENCOUNTER — TELEPHONE (OUTPATIENT)
Dept: FAMILY MEDICINE CLINIC | Facility: CLINIC | Age: 83
End: 2018-07-16

## 2018-07-16 LAB
ATRIAL RATE: 75 BPM
P AXIS: 40 DEGREES
P-R INTERVAL: 182 MS
Q-T INTERVAL: 408 MS
QRS DURATION: 78 MS
QTC CALCULATION (BEZET): 455 MS
R AXIS: 1 DEGREES
T AXIS: 41 DEGREES
VENTRICULAR RATE: 75 BPM

## 2018-07-16 NOTE — ED PROVIDER NOTES
Patient Seen in: [facility] Emergency Department    History   Patient presents with:  Abnormal Result (metabolic, cardiac)    Stated Complaint: R/O PE abn d-dimer from [address] [place] [address]    50-year-old female presents emergency department comi scleral icterus, mucous membranes are moist, there is no erythema or exudate in the posterior pharynx conjunctive are irritated bilaterally no yellow discharge but clear tears  Neck: Supple no JVD no lymphadenopathy no meningismus no carotid bruit  CV: Reg these tests on the individual orders. PROCALCITONIN   RAINBOW DRAW BLUE   RAINBOW DRAW LAVENDER   RAINBOW DRAW LIGHT GREEN   RAINBOW DRAW GOLD     EKG    Rate, intervals and axes as noted on EKG Report.   Rate: 75  Rhythm: Sinus Rhythm  Reading: Normal si 1956  ------------------------------------------------------------      [facility]     Patient was made aware of medical condition and instructed to take medications as prescribed. Patient is aware that they are to return to ED if any worsening problems.   Sydni

## 2018-07-16 NOTE — TELEPHONE ENCOUNTER
[facility] FOR AARONE IN BOTH EYES, HER LABS CAME BACK ABNORMAL, [facility], SHE WAS TOLD TO FOLLOW UP WITH DR Neli Herzog

## 2018-07-16 NOTE — TELEPHONE ENCOUNTER
Appointment scheduled.   Future Appointments  Date Time Provider Abundio Kent   7/17/2018 11:30 AM Wali Taveras DO [facility]

## 2018-07-17 ENCOUNTER — OFFICE VISIT (OUTPATIENT)
Dept: FAMILY MEDICINE CLINIC | Facility: CLINIC | Age: 83
End: 2018-07-17
Payer: COMMERCIAL

## 2018-07-17 VITALS
SYSTOLIC BLOOD PRESSURE: 100 MMHG | TEMPERATURE: 98 F | OXYGEN SATURATION: 96 % | DIASTOLIC BLOOD PRESSURE: 70 MMHG | WEIGHT: 154 LBS | HEART RATE: 78 BPM | BODY MASS INDEX: 29 KG/M2

## 2018-07-17 DIAGNOSIS — N18.30 STAGE 3 CHRONIC KIDNEY DISEASE (HCC): ICD-10-CM

## 2018-07-17 DIAGNOSIS — G30.8 ALZHEIMER'S DISEASE OF OTHER ONSET WITHOUT BEHAVIORAL DISTURBANCE: ICD-10-CM

## 2018-07-17 DIAGNOSIS — F02.80 ALZHEIMER'S DISEASE OF OTHER ONSET WITHOUT BEHAVIORAL DISTURBANCE: ICD-10-CM

## 2018-07-17 DIAGNOSIS — H10.013 ACUTE FOLLICULAR CONJUNCTIVITIS OF BOTH EYES: Primary | ICD-10-CM

## 2018-07-17 DIAGNOSIS — I10 ESSENTIAL HYPERTENSION: ICD-10-CM

## 2018-07-17 DIAGNOSIS — R78.89 BLOOD D-DIMER ASSAY POSITIVE: ICD-10-CM

## 2018-07-17 PROCEDURE — 99214 OFFICE O/P EST MOD 30 MIN: CPT | Performed by: FAMILY MEDICINE

## 2018-07-17 RX ORDER — TOBRAMYCIN AND DEXAMETHASONE 3; 1 MG/ML; MG/ML
1 SUSPENSION/ DROPS OPHTHALMIC
Qty: 1 BOTTLE | Refills: 0 | Status: SHIPPED | OUTPATIENT
Start: 2018-07-17 | End: 2019-03-25 | Stop reason: ALTCHOICE

## 2018-07-17 NOTE — PROGRESS NOTES
HPI:    Patient ID: Niya George is a 81year old female. Pt w/o conj - slight improvement  [facility] / ER over weekend  Lab / CT chest  Breathing OK  HPI    Review of Systems   Constitutional: Negative for chills and fever.    HENT: Negative for congestion an Oral Tab Take 1 tablet (10 mg total) by mouth every morning. Disp: 30 tablet Rfl: 5   acetaminophen (TYLENOL EX ST ARTHRITIS PAIN) 500 MG Oral Tab Take 1 tablet (500 mg total) by mouth every 4 (four) hours as needed for Pain.  Disp: 100 tablet Rfl: 2   trisha Suspension 1 Bottle 0      Sig: Place 1 drop into both eyes every 4 (four) hours while awake.            Imaging & Referrals:  None       #[id number]

## 2018-07-23 ENCOUNTER — TELEPHONE (OUTPATIENT)
Dept: FAMILY MEDICINE CLINIC | Facility: CLINIC | Age: 83
End: 2018-07-23

## 2018-07-23 NOTE — TELEPHONE ENCOUNTER
PLEASE CALL EMMANUEL WITH A STOP DATE FOR: tobramycin-dexamethasone 0.3-0.1 % Ophthalmic Suspension ----EYES ARE MUCH BETTER.

## 2018-08-27 RX ORDER — LORAZEPAM 0.5 MG/1
TABLET ORAL
Qty: 60 TABLET | Refills: 0 | Status: SHIPPED
Start: 2018-08-27

## 2018-08-28 ENCOUNTER — MED REC SCAN ONLY (OUTPATIENT)
Dept: FAMILY MEDICINE CLINIC | Facility: CLINIC | Age: 83
End: 2018-08-28

## 2018-11-12 ENCOUNTER — PATIENT OUTREACH (OUTPATIENT)
Dept: FAMILY MEDICINE CLINIC | Facility: CLINIC | Age: 83
End: 2018-11-12

## 2018-11-20 ENCOUNTER — MED REC SCAN ONLY (OUTPATIENT)
Dept: FAMILY MEDICINE CLINIC | Facility: CLINIC | Age: 83
End: 2018-11-20

## 2018-12-14 ENCOUNTER — TELEPHONE (OUTPATIENT)
Dept: FAMILY MEDICINE CLINIC | Facility: CLINIC | Age: 83
End: 2018-12-14

## 2018-12-14 RX ORDER — LOPERAMIDE HYDROCHLORIDE 2 MG/1
TABLET ORAL
Qty: 4 TABLET | Refills: 0 | Status: SHIPPED
Start: 2018-12-14

## 2018-12-14 NOTE — TELEPHONE ENCOUNTER
[place] SADIQ STARTED NOT FEELING WELL LAST EVENING, DIARRHEA STARTED THIS MORNING, ONGOING THROUGHOUT TODAY, NO FEVER. ASKING IF THEY CAN GIVE HER ANYTHING.   PLEASE ADVISE

## 2019-01-08 ENCOUNTER — TELEPHONE (OUTPATIENT)
Dept: FAMILY MEDICINE CLINIC | Facility: CLINIC | Age: 84
End: 2019-01-08

## 2019-01-08 NOTE — TELEPHONE ENCOUNTER
DAUGHTER NOTIFIED MAY GIVE HER DELSYM. SHE HAS BEEN COUGHING SINCE Friday, BUT NO ONE CALLED HER. SHE WILL GET THE DELSYM AND TAKE IT TO HER. INSTRUCTED IF SHE IS MISERABLE WITH THAT COUGH THEN SHOULD BE SEEN; EITHER HERE, OR URGENT CARE IN [place].   EXP

## 2019-01-08 NOTE — TELEPHONE ENCOUNTER
DAUGHTER CALLING ASKING WHAT MOTHER CAN TAKE FOR \"WET SOUNDING COUGH\";  ROUTED TO DR [facility] TO REVIEW MEDS AND RECOMMEND OTC MEDICATION

## 2019-01-11 ENCOUNTER — TELEPHONE (OUTPATIENT)
Dept: FAMILY MEDICINE CLINIC | Facility: CLINIC | Age: 84
End: 2019-01-11

## 2019-01-11 ENCOUNTER — MED REC SCAN ONLY (OUTPATIENT)
Dept: FAMILY MEDICINE CLINIC | Facility: CLINIC | Age: 84
End: 2019-01-11

## 2019-01-11 NOTE — TELEPHONE ENCOUNTER
PT HAS BEEN GETTING WORSE OVER THE PAST 3-4 DAYS WITH FLU LIKE SYMPTOMS; COUGHING, CONGESTION, FEVER. DAUGHTER ROBIN SAYS SHE WAS NOT GIVEN THE PROPER CARE OR OTC MEDICATION AT HER [facility]ORY [facility]ARE FACILITY. SHE WOULD LIKE TO SPEAK WITH RADHA.  SHE'S NOT REJI

## 2019-01-14 ENCOUNTER — PATIENT OUTREACH (OUTPATIENT)
Dept: FAMILY MEDICINE CLINIC | Facility: CLINIC | Age: 84
End: 2019-01-14

## 2019-01-14 NOTE — TELEPHONE ENCOUNTER
PER DAUGHTER- WHEN SAW HER MOM SHE WAS BETTER; SO ENDED UP NOT TAKING HER TO URGENT CARE. THEY HAVE A APN THAT WILL COME TO FACILITY TO SEE RESIDENTS, BUT NEED AN ORDER FROM THE DOCTOR.   IF MED IS ORDERED PRN PATIENT MUST ASK FOR THE MEDICATION; HER NETTA

## 2019-01-17 ENCOUNTER — TELEPHONE (OUTPATIENT)
Dept: FAMILY MEDICINE CLINIC | Facility: CLINIC | Age: 84
End: 2019-01-17

## 2019-01-17 ENCOUNTER — OFFICE VISIT (OUTPATIENT)
Dept: FAMILY MEDICINE CLINIC | Facility: CLINIC | Age: 84
End: 2019-01-17
Payer: COMMERCIAL

## 2019-01-17 VITALS
DIASTOLIC BLOOD PRESSURE: 78 MMHG | SYSTOLIC BLOOD PRESSURE: 120 MMHG | HEIGHT: 62 IN | WEIGHT: 150 LBS | BODY MASS INDEX: 27.6 KG/M2 | TEMPERATURE: 99 F | OXYGEN SATURATION: 93 % | HEART RATE: 87 BPM

## 2019-01-17 DIAGNOSIS — I10 ESSENTIAL HYPERTENSION: ICD-10-CM

## 2019-01-17 DIAGNOSIS — F32.4 MAJOR DEPRESSIVE DISORDER WITH SINGLE EPISODE, IN PARTIAL REMISSION (HCC): ICD-10-CM

## 2019-01-17 DIAGNOSIS — L21.9 SEBORRHEIC DERMATITIS OF SCALP: ICD-10-CM

## 2019-01-17 DIAGNOSIS — L30.9 ECZEMA, UNSPECIFIED TYPE: ICD-10-CM

## 2019-01-17 DIAGNOSIS — E55.9 VITAMIN D DEFICIENCY: ICD-10-CM

## 2019-01-17 DIAGNOSIS — J40 BRONCHITIS: Primary | ICD-10-CM

## 2019-01-17 DIAGNOSIS — D51.0 ANEMIA, PERNICIOUS: ICD-10-CM

## 2019-01-17 DIAGNOSIS — I77.9 BILATERAL CAROTID ARTERY DISEASE, UNSPECIFIED TYPE (HCC): ICD-10-CM

## 2019-01-17 LAB
ALBUMIN SERPL-MCNC: 3.1 G/DL (ref 3.1–4.5)
ALBUMIN/GLOB SERPL: 0.7 {RATIO} (ref 1–2)
ALP LIVER SERPL-CCNC: 99 U/L (ref 55–142)
ALT SERPL-CCNC: 16 U/L (ref 14–54)
ANION GAP SERPL CALC-SCNC: 6 MMOL/L (ref 0–18)
AST SERPL-CCNC: 14 U/L (ref 15–41)
BASOPHILS # BLD AUTO: 0.05 X10(3) UL (ref 0–0.1)
BASOPHILS NFR BLD AUTO: 0.7 %
BILIRUB SERPL-MCNC: 0.2 MG/DL (ref 0.1–2)
BUN BLD-MCNC: 25 MG/DL (ref 8–20)
BUN/CREAT SERPL: 18.8 (ref 10–20)
CALCIUM BLD-MCNC: 8.6 MG/DL (ref 8.3–10.3)
CHLORIDE SERPL-SCNC: 104 MMOL/L (ref 101–111)
CO2 SERPL-SCNC: 31 MMOL/L (ref 22–32)
CREAT BLD-MCNC: 1.33 MG/DL (ref 0.55–1.02)
EOSINOPHIL # BLD AUTO: 0.17 X10(3) UL (ref 0–0.3)
EOSINOPHIL NFR BLD AUTO: 2.5 %
ERYTHROCYTE [DISTWIDTH] IN BLOOD BY AUTOMATED COUNT: 14 % (ref 11.5–16)
GLOBULIN PLAS-MCNC: 4.3 G/DL (ref 2.8–4.4)
GLUCOSE BLD-MCNC: 86 MG/DL (ref 70–99)
HAV AB SERPL IA-ACNC: 951 PG/ML (ref 193–986)
HCT VFR BLD AUTO: 34.2 % (ref 34–50)
HGB BLD-MCNC: 10.7 G/DL (ref 12–16)
IMMATURE GRANULOCYTE COUNT: 0.02 X10(3) UL (ref 0–1)
IMMATURE GRANULOCYTE RATIO %: 0.3 %
LYMPHOCYTES # BLD AUTO: 2.77 X10(3) UL (ref 0.9–4)
LYMPHOCYTES NFR BLD AUTO: 40.7 %
M PROTEIN MFR SERPL ELPH: 7.4 G/DL (ref 6.4–8.2)
MCH RBC QN AUTO: 29.5 PG (ref 27–33.2)
MCHC RBC AUTO-ENTMCNC: 31.3 G/DL (ref 31–37)
MCV RBC AUTO: 94.2 FL (ref 81–100)
MONOCYTES # BLD AUTO: 0.7 X10(3) UL (ref 0.1–1)
MONOCYTES NFR BLD AUTO: 10.3 %
NEUTROPHIL ABS PRELIM: 3.1 X10 (3) UL (ref 1.3–6.7)
NEUTROPHILS # BLD AUTO: 3.1 X10(3) UL (ref 1.3–6.7)
NEUTROPHILS NFR BLD AUTO: 45.5 %
OSMOLALITY SERPL CALC.SUM OF ELEC: 296 MOSM/KG (ref 275–295)
PLATELET # BLD AUTO: 193 10(3)UL (ref 150–450)
POTASSIUM SERPL-SCNC: 4.1 MMOL/L (ref 3.6–5.1)
RBC # BLD AUTO: 3.63 X10(6)UL (ref 3.8–5.1)
RED CELL DISTRIBUTION WIDTH-SD: 48.3 FL (ref 35.1–46.3)
SODIUM SERPL-SCNC: 141 MMOL/L (ref 136–144)
VIT D+METAB SERPL-MCNC: 35.7 NG/ML (ref 30–100)
WBC # BLD AUTO: 6.8 X10(3) UL (ref 4–13)

## 2019-01-17 PROCEDURE — 99215 OFFICE O/P EST HI 40 MIN: CPT | Performed by: FAMILY MEDICINE

## 2019-01-17 PROCEDURE — 80053 COMPREHEN METABOLIC PANEL: CPT | Performed by: FAMILY MEDICINE

## 2019-01-17 PROCEDURE — 85025 COMPLETE CBC W/AUTO DIFF WBC: CPT | Performed by: FAMILY MEDICINE

## 2019-01-17 PROCEDURE — 82607 VITAMIN B-12: CPT | Performed by: FAMILY MEDICINE

## 2019-01-17 PROCEDURE — 82306 VITAMIN D 25 HYDROXY: CPT | Performed by: FAMILY MEDICINE

## 2019-01-17 RX ORDER — DEXTROMETHORPHAN POLISTIREX 30 MG/5ML
5 SUSPENSION ORAL
COMMUNITY
End: 2019-03-21

## 2019-01-17 RX ORDER — PREDNISONE 20 MG/1
20 TABLET ORAL 2 TIMES DAILY
Qty: 10 TABLET | Refills: 0 | Status: SHIPPED | OUTPATIENT
Start: 2019-01-17 | End: 2019-01-22

## 2019-01-17 RX ORDER — AZITHROMYCIN 250 MG/1
TABLET, FILM COATED ORAL
COMMUNITY
Start: 2019-01-16 | End: 2019-02-05 | Stop reason: ALTCHOICE

## 2019-01-17 NOTE — PROGRESS NOTES
HPI:    Patient ID: Prieto Mata is a 81year old female. X 2 wks  John / cough  Seen UC yest - sinusitis / bronchitis  Rash L chest - ? Itchy / w/o pain  Dry skin    HPI    Review of Systems   Constitutional: Positive for fatigue.  Negative for chills eyes every 4 (four) hours while awake. Disp: 1 Bottle Rfl: 0   Cholecalciferol (VITAMIN D3) 2000 units Oral Tab Take 1 tablet by mouth daily. Disp: 90 tablet Rfl: 3   Vitamin B-12 500 MCG Oral Tab Take 1 tablet (500 mcg total) by mouth daily.  Disp: 90 tabl 27.44 kg/m²          ASSESSMENT/PLAN:   Bronchitis  (primary encounter diagnosis)  Bilateral carotid artery disease, unspecified type (hcc)  Major depressive disorder with single episode, in partial remission (hcc)  Anemia, pernicious  Vitamin d deficiency

## 2019-01-17 NOTE — TELEPHONE ENCOUNTER
Spoke to Sunday, nurse, and he is going to check that they received orders and to call me back. She is to be on prednisone, finish zithromax, use moisturizing lotion.

## 2019-01-18 DIAGNOSIS — E78.5 HYPERLIPIDEMIA, UNSPECIFIED HYPERLIPIDEMIA TYPE: ICD-10-CM

## 2019-01-18 DIAGNOSIS — I10 ESSENTIAL HYPERTENSION: Primary | ICD-10-CM

## 2019-01-18 DIAGNOSIS — E55.9 VITAMIN D DEFICIENCY: ICD-10-CM

## 2019-01-18 DIAGNOSIS — D51.0 ANEMIA, PERNICIOUS: ICD-10-CM

## 2019-01-21 ENCOUNTER — TELEPHONE (OUTPATIENT)
Dept: FAMILY MEDICINE CLINIC | Facility: CLINIC | Age: 84
End: 2019-01-21

## 2019-01-21 DIAGNOSIS — J30.89 NON-SEASONAL ALLERGIC RHINITIS DUE TO OTHER ALLERGIC TRIGGER: ICD-10-CM

## 2019-01-21 RX ORDER — LORATADINE 10 MG/1
10 TABLET ORAL DAILY
Qty: 30 TABLET | Refills: 11 | Status: SHIPPED | OUTPATIENT
Start: 2019-01-21 | End: 2020-09-29

## 2019-01-22 NOTE — TELEPHONE ENCOUNTER
Change claritin to 10 mg po daily. Ej/cj    Faxed to [facility].

## 2019-01-31 ENCOUNTER — TELEPHONE (OUTPATIENT)
Dept: FAMILY MEDICINE CLINIC | Facility: CLINIC | Age: 84
End: 2019-01-31

## 2019-01-31 NOTE — TELEPHONE ENCOUNTER
Court Agrawal, [facility], SHE HAS A BIG GOOSE EGG ON HER HEAD AND A SORE HIP.

## 2019-01-31 NOTE — TELEPHONE ENCOUNTER
Danielle Arriaga was admitted to [facility]; daughter will keep us posted. Did ct scan of head, and abd, because was vomiting. Also, x-ray of hip--no fracture.

## 2019-02-05 ENCOUNTER — APPOINTMENT (OUTPATIENT)
Dept: LAB | Age: 84
End: 2019-02-05
Attending: FAMILY MEDICINE
Payer: MEDICARE

## 2019-02-05 ENCOUNTER — OFFICE VISIT (OUTPATIENT)
Dept: FAMILY MEDICINE CLINIC | Facility: CLINIC | Age: 84
End: 2019-02-05
Payer: COMMERCIAL

## 2019-02-05 VITALS
HEIGHT: 62 IN | DIASTOLIC BLOOD PRESSURE: 64 MMHG | BODY MASS INDEX: 27.6 KG/M2 | TEMPERATURE: 98 F | SYSTOLIC BLOOD PRESSURE: 136 MMHG | WEIGHT: 150 LBS | HEART RATE: 74 BPM | OXYGEN SATURATION: 95 %

## 2019-02-05 DIAGNOSIS — G30.8 ALZHEIMER'S DISEASE OF OTHER ONSET WITHOUT BEHAVIORAL DISTURBANCE: ICD-10-CM

## 2019-02-05 DIAGNOSIS — I10 ESSENTIAL HYPERTENSION: Primary | ICD-10-CM

## 2019-02-05 DIAGNOSIS — Z87.19 HISTORY OF ACUTE PANCREATITIS: ICD-10-CM

## 2019-02-05 DIAGNOSIS — F02.80 ALZHEIMER'S DISEASE OF OTHER ONSET WITHOUT BEHAVIORAL DISTURBANCE: ICD-10-CM
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
## 2019-02-05 DIAGNOSIS — E87.6 HYPOKALEMIA: ICD-10-CM

## 2019-02-05 DIAGNOSIS — I77.9 BILATERAL CAROTID ARTERY DISEASE, UNSPECIFIED TYPE (HCC): ICD-10-CM

## 2019-02-05 DIAGNOSIS — I10 ESSENTIAL HYPERTENSION: ICD-10-CM

## 2019-02-05 DIAGNOSIS — N18.30 STAGE 3 CHRONIC KIDNEY DISEASE (HCC): ICD-10-CM

## 2019-02-05 LAB
ANION GAP SERPL CALC-SCNC: 10 MMOL/L (ref 0–18)
BUN BLD-MCNC: 16 MG/DL (ref 8–20)
BUN/CREAT SERPL: 13.7 (ref 10–20)
CALCIUM BLD-MCNC: 9.2 MG/DL (ref 8.3–10.3)
CHLORIDE SERPL-SCNC: 106 MMOL/L (ref 101–111)
CO2 SERPL-SCNC: 30 MMOL/L (ref 22–32)
CREAT BLD-MCNC: 1.17 MG/DL (ref 0.55–1.02)
GLUCOSE BLD-MCNC: 111 MG/DL (ref 70–99)
OSMOLALITY SERPL CALC.SUM OF ELEC: 304 MOSM/KG (ref 275–295)
POTASSIUM SERPL-SCNC: 3.4 MMOL/L (ref 3.6–5.1)
SODIUM SERPL-SCNC: 146 MMOL/L (ref 136–144)

## 2019-02-05 PROCEDURE — 36415 COLL VENOUS BLD VENIPUNCTURE: CPT

## 2019-02-05 PROCEDURE — 99215 OFFICE O/P EST HI 40 MIN: CPT | Performed by: FAMILY MEDICINE

## 2019-02-05 PROCEDURE — 80048 BASIC METABOLIC PNL TOTAL CA: CPT

## 2019-02-05 PROCEDURE — 1111F DSCHRG MED/CURRENT MED MERGE: CPT | Performed by: FAMILY MEDICINE

## 2019-02-05 NOTE — PATIENT INSTRUCTIONS
[facility] records reviewed with daughter. Medications reviewed. Check basic metabolic today. Continue medications. Encourage ambulation with assist.  Discussed safety. Call with questions or problems.

## 2019-02-05 NOTE — PROGRESS NOTES
PT WALKING O2 SET WAS   91%- PULSE 80  92%- PULSE 93  93%-PULSE 94  94%-PULSE 95  95%- PULSE 95  94%-PULSE 96   93%-PULSE 101  92%-PULSE 100  94%- PULSE 100

## 2019-02-05 NOTE — PROGRESS NOTES
HPI:    Patient ID: Kourtney Awan is a 81year old female. S/p hosp pancreatitis / head trauma  ? Low O2 when walking in hosp per daughter. Walking okay. Without further episodes of falling. Some lightheadedness with position change.   Without cough, breakfast. (Patient taking differently: Take 20 mg by mouth every morning.  ) Disp: 30 capsule Rfl: 5   simvastatin 40 MG Oral Tab Take 1 tablet (40 mg total) by mouth nightly.  Disp: 90 tablet Rfl: 1   Memantine HCl (NAMENDA) 10 MG Oral Tab Take 1 tablet ( 74   Temp 98.3 °F (36.8 °C) (Temporal)   Ht 62\"   Wt 150 lb   SpO2 95%   BMI 27.44 kg/m²          ASSESSMENT/PLAN:   Essential hypertension  (primary encounter diagnosis)  Bilateral carotid artery disease, unspecified type (hcc)  Alzheimer's disease of ot

## 2019-02-06 RX ORDER — POTASSIUM CHLORIDE 750 MG/1
10 TABLET, FILM COATED, EXTENDED RELEASE ORAL DAILY
Qty: 1 TABLET | Refills: 0 | COMMUNITY
Start: 2019-02-06 | End: 2020-09-29

## 2019-02-07 ENCOUNTER — TELEPHONE (OUTPATIENT)
Dept: FAMILY MEDICINE CLINIC | Facility: CLINIC | Age: 84
End: 2019-02-07

## 2019-02-07 DIAGNOSIS — D51.0 ANEMIA, PERNICIOUS: Primary | ICD-10-CM

## 2019-02-07 DIAGNOSIS — E87.6 HYPOKALEMIA: ICD-10-CM

## 2019-02-07 DIAGNOSIS — I10 ESSENTIAL HYPERTENSION: ICD-10-CM

## 2019-02-07 RX ORDER — DIPHENHYDRAMINE HCL 25 MG
25 CAPSULE ORAL EVERY 6 HOURS PRN
COMMUNITY
End: 2020-09-29

## 2019-02-07 NOTE — TELEPHONE ENCOUNTER
[address] HER MOM'S HGB HAD DROPPED TO 9.? AND FORGOT TO ASK ABOUT IF NEEDED TO RESTART HER IRON. ADVISED- WILL RECHECK CBC AND FERRITIN BEFORE MAKES DECISION.   WILL FAX ORDER

## 2019-02-14 ENCOUNTER — TELEPHONE (OUTPATIENT)
Dept: FAMILY MEDICINE CLINIC | Facility: CLINIC | Age: 84
End: 2019-02-14

## 2019-03-08 ENCOUNTER — TELEPHONE (OUTPATIENT)
Dept: FAMILY MEDICINE CLINIC | Facility: CLINIC | Age: 84
End: 2019-03-08

## 2019-03-11 ENCOUNTER — TELEPHONE (OUTPATIENT)
Dept: FAMILY MEDICINE CLINIC | Facility: CLINIC | Age: 84
End: 2019-03-11

## 2019-03-11 NOTE — TELEPHONE ENCOUNTER
Patient seen 2/5/2019; if no changes, then ok to  have daughter come alone for appt. For record completion.     [id number]

## 2019-03-13 ENCOUNTER — MED REC SCAN ONLY (OUTPATIENT)
Dept: FAMILY MEDICINE CLINIC | Facility: CLINIC | Age: 84
End: 2019-03-13

## 2019-03-14 ENCOUNTER — TELEPHONE (OUTPATIENT)
Dept: FAMILY MEDICINE CLINIC | Facility: CLINIC | Age: 84
End: 2019-03-14

## 2019-03-14 NOTE — TELEPHONE ENCOUNTER
Daughter reports facility is crushing her mother's meds and putting in a paste; daughter has no problem with feeding her or giving her pills. Daughter upset because knows  namenda tab and colestipol should not be crushed.   They told her got ok from the do

## 2019-03-14 NOTE — TELEPHONE ENCOUNTER
ANJEL REPORTS NURSE WITH RESIDENT; ANJEL TOOK MESSAGE ORDER FAXED TO NOT CRUSH MEDICATIONS. CALL BACK IF QUESTIONS.   EJ/CJ

## 2019-03-16 PROCEDURE — 80048 BASIC METABOLIC PNL TOTAL CA: CPT | Performed by: FAMILY MEDICINE

## 2019-03-16 NOTE — PROGRESS NOTES
HPI:   Darreld Mcardle is a 81year old female who presents for a MA (Medicare Advantage) Supervisit (Once per calendar year).     Cooperative / follows instructions  Annual Physical due on 05/26/2019        Fall/Risk Assessment abnormal    She has been scr functional status. Shop for groceries: Cannot do without help   She has difficulties Taking Meds as Rx'd based on screening of functional status.    Taking medications as prescribed: Cannot do without help   She has difficulties Affording Meds based on sc disease ([facility] [address]     Seborrheic dermatitis of scalp     Eczema     History of acute pancreatitis    Wt Readings from Last 3 Encounters:  03/16/19 : 148 lb  02/05/19 : 150 lb  01/17/19 : 150 lb     Last Cholesterol Labs:   Lab Results   Component Value Date Tab Take 1 tablet by mouth daily. Vitamin B-12 500 MCG Oral Tab Take 1 tablet (500 mcg total) by mouth daily. AmLODIPine Besylate 2.5 MG Oral Tab Take 2.5 mg by mouth daily. Memantine HCl 5 MG Oral Tab Take 1 tablet by mouth nightly.    furosemide 40 shortness of breath with exertion  CARDIOVASCULAR: denies chest pain on exertion  GI: denies abdominal pain, denies heartburn  : denies dysuria, vaginal discharge or itching, no complaint of urinary incontinence   MUSCULOSKELETAL: denies back pain  NEURO Immunization History   Administered Date(s) Administered   • FLU VACC High Dose 65 YRS & Older PRSV Free (63876) 09/27/2015, 10/20/2016   • Pneumococcal (Prevnar 13) 04/30/2016   • Pneumovax 23 03/16/2019        ASSESSMENT AND OTHER RELEVANT CHRONIC CO indicates understanding of these issues and agrees to the plan. Reinforced healthy diet, lifestyle, and exercise. Lab work ordered. Continue with current treatment plan. No Follow-up on file.      Jayy Ponce DO, 3/16/2019     General Health     In t 21-65 or Pap+HPV every 5 yrs age 33-67, age 72 and older at high risk There are no preventive care reminders to display for this patient.  Update Health Maintenance if applicable    Chlamydia  Annually if high risk No results found for: CHLAMYDIA No flowshe No flowsheet data found.                Template: ALE CASEY MEDICARE ANNUAL ASSESSMENT FEMALE [19965]

## 2019-03-16 NOTE — PATIENT INSTRUCTIONS
Loraine Pedroza's SCREENING SCHEDULE   Tests on this list are recommended by your physician but may not be covered, or covered at this frequency, by your insurer. Please check with your insurance carrier before scheduling to verify coverage.    PREVENTATI more often if abnormal There are no preventive care reminders to display for this patient. Update Health Maintenance if applicable    Flex Sigmoidoscopy Screen  Covered every 5 years No results found for this or any previous visit. No flowsheet data found. on 04/30/16   • PNEUMOCOCCAL VACC, 13 KAREN IM    Please get once after your 65th birthday    Pneumococcal 23 (Pneumovax)  Covered Once after 65 Orders placed or performed in visit on 03/16/19   • PNEUMOCOCCAL IMM (PNEUMOVAX)    Please get once after your 65

## 2019-03-18 ENCOUNTER — TELEPHONE (OUTPATIENT)
Dept: FAMILY MEDICINE CLINIC | Facility: CLINIC | Age: 84
End: 2019-03-18

## 2019-03-18 NOTE — TELEPHONE ENCOUNTER
Yes, flu shot given on 10/11/2018 at  [facility]. Also, they need discharge order for patient on 03/23/2019 from their facility.   Checked with Dr Jovon García and he gave verbal order to discharge from [facility] on Saturday, 03

## 2019-03-18 NOTE — TELEPHONE ENCOUNTER
PLEASE FAX OVER CHEST X-RAY TO PROVE SHE DOES NOT HAVE TB, DOS WAS 7/15/2018.   PLEASE FAX TO: [phone]

## 2019-03-18 NOTE — TELEPHONE ENCOUNTER
Updated immunization faxed to [facility]; as well as additional comment on CXR-that no signs of active TB.  ej/cj

## 2019-03-19 DIAGNOSIS — I10 ESSENTIAL HYPERTENSION: ICD-10-CM

## 2019-03-19 DIAGNOSIS — N18.30 STAGE 3 CHRONIC KIDNEY DISEASE (HCC): ICD-10-CM

## 2019-03-19 DIAGNOSIS — D51.0 ANEMIA, PERNICIOUS: Primary | ICD-10-CM

## 2019-03-21 ENCOUNTER — TELEPHONE (OUTPATIENT)
Dept: FAMILY MEDICINE CLINIC | Facility: CLINIC | Age: 84
End: 2019-03-21

## 2019-03-21 DIAGNOSIS — R05.9 COUGH: Primary | ICD-10-CM

## 2019-03-21 RX ORDER — DEXTROMETHORPHAN POLISTIREX 30 MG/5ML
5 SUSPENSION ORAL EVERY 12 HOURS PRN
Qty: 89 ML | Refills: 0 | Status: SHIPPED
Start: 2019-03-21

## 2019-03-25 ENCOUNTER — TELEPHONE (OUTPATIENT)
Dept: FAMILY MEDICINE CLINIC | Facility: CLINIC | Age: 84
End: 2019-03-25

## 2019-03-25 NOTE — TELEPHONE ENCOUNTER
Patient is a new resident at [facility]. Walt Brown would like to clarify the patient's medication list with you.

## 2019-03-25 NOTE — TELEPHONE ENCOUNTER
Ok to d/c tobramycin eye drops and selsun blue shampoo. Keep on amlodipine and monitor B/P twice a week for 2 weeks then fax readings to Dr Geraldine Winn.   ej/cj

## 2019-03-29 ENCOUNTER — TELEPHONE (OUTPATIENT)
Dept: FAMILY MEDICINE CLINIC | Facility: CLINIC | Age: 84
End: 2019-03-29

## 2019-04-03 ENCOUNTER — MED REC SCAN ONLY (OUTPATIENT)
Dept: FAMILY MEDICINE CLINIC | Facility: CLINIC | Age: 84
End: 2019-04-03

## 2019-04-19 ENCOUNTER — TELEPHONE (OUTPATIENT)
Dept: FAMILY MEDICINE CLINIC | Facility: CLINIC | Age: 84
End: 2019-04-19

## 2019-04-19 NOTE — TELEPHONE ENCOUNTER
On 3/29 Dr Ricky Paul had ordered a UA, C&S and CBC because patient was having increased confusion and weakness. The CNA did not draw the CBC (didn't see it on the order)   Does Dr still want the blood test even though symptoms have resolved.

## 2019-05-20 ENCOUNTER — TELEPHONE (OUTPATIENT)
Dept: FAMILY MEDICINE CLINIC | Facility: CLINIC | Age: 84
End: 2019-05-20

## 2019-05-20 NOTE — TELEPHONE ENCOUNTER
BMP DRAWN ON 5/20/2019 AND REVIEWED BY DR Sandra Richards- ALL RESULTS NORMAL. CHECK CMP,VIT B12, AND CBC IN AUGUST 2019. ORDERS FAXED TO NANNETTE AND VERBAL ORDER GIVEN TO MILTON.   SHANTE/LUANNE

## 2019-06-04 ENCOUNTER — MED REC SCAN ONLY (OUTPATIENT)
Dept: FAMILY MEDICINE CLINIC | Facility: CLINIC | Age: 84
End: 2019-06-04

## 2019-07-31 ENCOUNTER — MED REC SCAN ONLY (OUTPATIENT)
Dept: FAMILY MEDICINE CLINIC | Facility: CLINIC | Age: 84
End: 2019-07-31

## 2019-08-09 ENCOUNTER — MED REC SCAN ONLY (OUTPATIENT)
Dept: FAMILY MEDICINE CLINIC | Facility: CLINIC | Age: 84
End: 2019-08-09

## 2019-08-09 ENCOUNTER — TELEPHONE (OUTPATIENT)
Dept: FAMILY MEDICINE CLINIC | Facility: CLINIC | Age: 84
End: 2019-08-09

## 2019-09-03 ENCOUNTER — TELEPHONE (OUTPATIENT)
Dept: FAMILY MEDICINE CLINIC | Facility: CLINIC | Age: 84
End: 2019-09-03

## 2019-09-04 ENCOUNTER — TELEPHONE (OUTPATIENT)
Dept: FAMILY MEDICINE CLINIC | Facility: CLINIC | Age: 84
End: 2019-09-04

## 2019-09-04 NOTE — TELEPHONE ENCOUNTER
Fax received from [facility] stating the following :Resident is unable to swallow Colestipol tab because it is too large. Is there an alternative for this medication that is easier to take that we can have an order for? \" Discussed

## 2019-09-06 ENCOUNTER — TELEPHONE (OUTPATIENT)
Dept: FAMILY MEDICINE CLINIC | Facility: CLINIC | Age: 84
End: 2019-09-06

## 2019-09-06 RX ORDER — COLESTIPOL HYDROCHLORIDE 5 G/5G
5 GRANULE, FOR SUSPENSION ORAL DAILY
Qty: 300 G | Refills: 0 | Status: SHIPPED | OUTPATIENT
Start: 2019-09-06 | End: 2019-10-06

## 2019-09-06 NOTE — TELEPHONE ENCOUNTER
Daughter states Mother having great difficulty in swallowing Colestipol tab, twice the size of a vitamin. Can't be crushed, makes her sick. Reported to Dr Tory Steele. Advised- will fax order for the granules.   To discontinue the tablet form  Nikita Arias at

## 2019-09-16 ENCOUNTER — TELEPHONE (OUTPATIENT)
Dept: FAMILY MEDICINE CLINIC | Facility: CLINIC | Age: 84
End: 2019-09-16

## 2019-09-18 ENCOUNTER — TELEPHONE (OUTPATIENT)
Dept: FAMILY MEDICINE CLINIC | Facility: CLINIC | Age: 84
End: 2019-09-18

## 2019-09-18 NOTE — TELEPHONE ENCOUNTER
PT order received from [facility]. Order signed by Dr. Aparna Leo and faxed back to [phone].

## 2019-09-24 ENCOUNTER — TELEPHONE (OUTPATIENT)
Dept: FAMILY MEDICINE CLINIC | Facility: CLINIC | Age: 84
End: 2019-09-24

## 2019-09-24 NOTE — TELEPHONE ENCOUNTER
Was in ED 9/16/2019 for fall, tripped on a walker. Doing well. Advised- keep scheduled 6 mth recheck. appt made for Oct 26, 2019.   jose/marcia

## 2019-09-25 ENCOUNTER — TELEPHONE (OUTPATIENT)
Dept: FAMILY MEDICINE CLINIC | Facility: CLINIC | Age: 84
End: 2019-09-25

## 2019-09-27 NOTE — TELEPHONE ENCOUNTER
NURSE OBSERVED A BRUISE 7.5 CM x 4 CM ON RESIDENT LOWER LEFT SIDE/TUMMY. NO C/O PAIN. WILL MONITOR.   DR Lizzette Roth MADE AWARE

## 2019-10-15 ENCOUNTER — MED REC SCAN ONLY (OUTPATIENT)
Dept: FAMILY MEDICINE CLINIC | Facility: CLINIC | Age: 84
End: 2019-10-15

## 2019-10-15 ENCOUNTER — TELEPHONE (OUTPATIENT)
Dept: FAMILY MEDICINE CLINIC | Facility: CLINIC | Age: 84
End: 2019-10-15

## 2019-10-15 DIAGNOSIS — D51.0 ANEMIA, PERNICIOUS: ICD-10-CM

## 2019-10-15 DIAGNOSIS — E55.9 VITAMIN D DEFICIENCY: ICD-10-CM

## 2019-10-15 RX ORDER — CHOLECALCIFEROL (VITAMIN D3) 125 MCG
500 CAPSULE ORAL
Qty: 45 TABLET | Refills: 3 | COMMUNITY
Start: 2019-10-15 | End: 2020-09-29

## 2019-10-15 NOTE — TELEPHONE ENCOUNTER
Some labs done back on 8-9-19 per MD request/her Vit B 12 came back above the range. She said Dr. Angi Bailey was faxed the results but no orders given. Pt. Takes 500 mg. Vit B daily.

## 2019-10-25 PROBLEM — R29.6 FREQUENT FALLS: Status: ACTIVE | Noted: 2019-10-25

## 2019-10-25 NOTE — PROGRESS NOTES
HPI:    Patient ID: Jaycob Ho is a 81year old female. Has had a couple falls - catches foot  PT - now  Patient here with daughter. Without other problems or concerns. Blood pressure stable. Appetite good. Breathing without difficulties.   Withou differently: Take 20 mg by mouth every morning.  ), Disp: 30 capsule, Rfl: 5  simvastatin 40 MG Oral Tab, Take 1 tablet (40 mg total) by mouth nightly., Disp: 90 tablet, Rfl: 1  Memantine HCl (NAMENDA) 10 MG Oral Tab, Take 1 tablet (10 mg total) by mouth e hyperlipidemia type  Stage 3 chronic kidney disease (hcc)  Major depressive disorder with single episode, in partial remission (hcc)  Frequent falls    No orders of the defined types were placed in this encounter.       Meds This Visit:  Requested Prescript

## 2019-10-25 NOTE — PATIENT INSTRUCTIONS
Continue with physical therapy. Continue with medications as directed. Reevaluate in 6 months. Lab in 4 months. Call with questions or problems.

## 2019-12-23 ENCOUNTER — OFFICE VISIT (OUTPATIENT)
Dept: FAMILY MEDICINE CLINIC | Facility: CLINIC | Age: 84
End: 2019-12-23
Payer: COMMERCIAL

## 2019-12-23 VITALS
SYSTOLIC BLOOD PRESSURE: 130 MMHG | DIASTOLIC BLOOD PRESSURE: 60 MMHG | WEIGHT: 142 LBS | BODY MASS INDEX: 27 KG/M2 | HEART RATE: 64 BPM | RESPIRATION RATE: 16 BRPM

## 2019-12-23 DIAGNOSIS — G30.9 ALZHEIMER'S DEMENTIA WITHOUT BEHAVIORAL DISTURBANCE, UNSPECIFIED TIMING OF DEMENTIA ONSET (HCC): ICD-10-CM

## 2019-12-23 DIAGNOSIS — I10 ESSENTIAL HYPERTENSION: ICD-10-CM

## 2019-12-23 DIAGNOSIS — R53.83 FATIGUE, UNSPECIFIED TYPE: ICD-10-CM

## 2019-12-23 DIAGNOSIS — F02.80 ALZHEIMER'S DEMENTIA WITHOUT BEHAVIORAL DISTURBANCE, UNSPECIFIED TIMING OF DEMENTIA ONSET (HCC): ICD-10-CM

## 2019-12-23 DIAGNOSIS — L85.8 CUTANEOUS HORN: Primary | ICD-10-CM

## 2019-12-23 PROCEDURE — 99214 OFFICE O/P EST MOD 30 MIN: CPT | Performed by: FAMILY MEDICINE

## 2019-12-23 PROCEDURE — 11401 EXC TR-EXT B9+MARG 0.6-1 CM: CPT | Performed by: FAMILY MEDICINE

## 2019-12-23 PROCEDURE — 88305 TISSUE EXAM BY PATHOLOGIST: CPT | Performed by: FAMILY MEDICINE

## 2019-12-23 RX ORDER — COLESTIPOL HYDROCHLORIDE 5 G/5G
GRANULE, FOR SUSPENSION ORAL DAILY
COMMUNITY
Start: 2019-12-02 | End: 2020-08-28

## 2019-12-23 NOTE — PATIENT INSTRUCTIONS
Wound care with Vaseline dressing 2-3 times daily. After 3 days may leave open to air at night. Sutures out in 10 days. Discussed sleep routine. Continue same medications.   After review of lab with lab being normal in August and no other change in symp

## 2019-12-23 NOTE — PROGRESS NOTES
HPI:    Patient ID: Jaycob Ho is a 81year old female. Lesion R lat dorsum hand  + bleeding / irritated  ? Increase size  C/o fatigue  Poor day / night routine  HPI    Review of Systems   Constitutional: Positive for fatigue.  Negative for chills and (Patient taking differently: Take 20 mg by mouth every morning.  ) 30 capsule 5   • simvastatin 40 MG Oral Tab Take 1 tablet (40 mg total) by mouth nightly.  90 tablet 1   • Memantine HCl (NAMENDA) 10 MG Oral Tab Take 1 tablet (10 mg total) by mouth every m unspecified type    Orders Placed This Encounter      Specimen to Pathology, Tissue [E]      Meds This Visit:  Requested Prescriptions      No prescriptions requested or ordered in this encounter       Imaging & Referrals:  None       RO#[id number]

## 2020-01-02 ENCOUNTER — TELEPHONE (OUTPATIENT)
Dept: FAMILY MEDICINE CLINIC | Facility: CLINIC | Age: 85
End: 2020-01-02

## 2020-01-02 ENCOUNTER — OFFICE VISIT (OUTPATIENT)
Dept: FAMILY MEDICINE CLINIC | Facility: CLINIC | Age: 85
End: 2020-01-02
Payer: COMMERCIAL

## 2020-01-02 VITALS
TEMPERATURE: 99 F | DIASTOLIC BLOOD PRESSURE: 80 MMHG | HEIGHT: 61 IN | SYSTOLIC BLOOD PRESSURE: 138 MMHG | WEIGHT: 142 LBS | HEART RATE: 82 BPM | BODY MASS INDEX: 26.81 KG/M2

## 2020-01-02 DIAGNOSIS — J40 BRONCHITIS: ICD-10-CM

## 2020-01-02 DIAGNOSIS — F32.4 MAJOR DEPRESSIVE DISORDER WITH SINGLE EPISODE, IN PARTIAL REMISSION (HCC): ICD-10-CM

## 2020-01-02 DIAGNOSIS — N18.30 STAGE 3 CHRONIC KIDNEY DISEASE (HCC): ICD-10-CM

## 2020-01-02 DIAGNOSIS — I77.9 BILATERAL CAROTID ARTERY DISEASE, UNSPECIFIED TYPE (HCC): ICD-10-CM

## 2020-01-02 DIAGNOSIS — J40 BRONCHITIS: Primary | ICD-10-CM

## 2020-01-02 DIAGNOSIS — R09.02 HYPOXEMIA: ICD-10-CM

## 2020-01-02 DIAGNOSIS — J32.9 CHRONIC SINUSITIS, UNSPECIFIED LOCATION: ICD-10-CM

## 2020-01-02 PROCEDURE — 99214 OFFICE O/P EST MOD 30 MIN: CPT | Performed by: FAMILY MEDICINE

## 2020-01-02 RX ORDER — AZITHROMYCIN 250 MG/1
TABLET, FILM COATED ORAL
Qty: 6 TABLET | Refills: 0 | Status: SHIPPED | OUTPATIENT
Start: 2020-01-02 | End: 2020-06-08 | Stop reason: ALTCHOICE

## 2020-01-02 RX ORDER — PREDNISONE 20 MG/1
40 TABLET ORAL DAILY
Qty: 10 TABLET | Refills: 0 | Status: SHIPPED | OUTPATIENT
Start: 2020-01-02 | End: 2020-01-07

## 2020-01-02 NOTE — TELEPHONE ENCOUNTER
CALL TO [facility]/DIANN GAMEZ SPOKE TO SADIA, NURSE. MADE AWARE OF 2 SCRIPTS BEING ORDERED; SHE WILL PROCESS SCRIPTS ONCE SHE RECEIVES THEM. MESSAGE LEFT FOR DAUGHTER, ROLANDA, THAT 2 MEDS ORDERED AND FAXED TO [facility], AND SPOKE TO NURSE SADIA.   SHE WILL IN

## 2020-01-02 NOTE — PATIENT INSTRUCTIONS
REST,FLUIDS,ADVIL / TYLENOL PRN fever / body aches  CALL NO CHANGE WORSENING  DISCUSSED WARNING SIGNS  F/U No change 2-3 days  meds as directed  Wound care

## 2020-01-02 NOTE — PROGRESS NOTES
HPI:    Patient ID: Saint Levan is a 81year old female. Pt w/ wet cough  ? Low oximetry  W/o problems w/ meds  R hand - doing well  fatigue  HPI    Review of Systems   Constitutional: Positive for fatigue. Negative for chills and fever.    HENT: Posit total) by mouth 2 (two) times daily with meals. 60 tablet 2   • aspirin 81 MG Oral Tab EC Take 1 tablet (81 mg total) by mouth daily.  30 tablet 11   • omeprazole 20 MG Oral Capsule Delayed Release Take 1 capsule (20 mg total) by mouth every morning before 26.83 kg/m²              ASSESSMENT/PLAN:   Bronchitis  (primary encounter diagnosis)  Hypoxemia  Major depressive disorder with single episode, in partial remission (hcc)  Bilateral carotid artery disease, unspecified type (hcc)  Stage 3 chronic kidney di

## 2020-01-03 ENCOUNTER — MED REC SCAN ONLY (OUTPATIENT)
Dept: FAMILY MEDICINE CLINIC | Facility: CLINIC | Age: 85
End: 2020-01-03

## 2020-01-14 ENCOUNTER — TELEPHONE (OUTPATIENT)
Dept: FAMILY MEDICINE CLINIC | Facility: CLINIC | Age: 85
End: 2020-01-14

## 2020-01-14 NOTE — TELEPHONE ENCOUNTER
Resident observed kneeling on floor in appartment near doorway. No apparent injuries. Denies pain. Resident able to ambulate without issues. Will continue to monitor. Dr Ammon Schmidt made aware.   jose/cj

## 2020-02-04 ENCOUNTER — TELEPHONE (OUTPATIENT)
Dept: FAMILY MEDICINE CLINIC | Facility: CLINIC | Age: 85
End: 2020-02-04

## 2020-02-04 NOTE — TELEPHONE ENCOUNTER
Per nursing at [facility] has been chewing her medications at times. Her daughter is concerned of adverse effects effects to [address] if she is chewing medications that should not be crushed.   Could MD please review her med's and change meds that w

## 2020-02-17 ENCOUNTER — MED REC SCAN ONLY (OUTPATIENT)
Dept: FAMILY MEDICINE CLINIC | Facility: CLINIC | Age: 85
End: 2020-02-17

## 2020-04-08 ENCOUNTER — TELEPHONE (OUTPATIENT)
Dept: FAMILY MEDICINE CLINIC | Facility: CLINIC | Age: 85
End: 2020-04-08

## 2020-04-08 RX ORDER — SERTRALINE HYDROCHLORIDE 25 MG/1
25 TABLET, FILM COATED ORAL DAILY
Qty: 30 TABLET | Refills: 0 | Status: SHIPPED
Start: 2020-04-08

## 2020-04-08 NOTE — TELEPHONE ENCOUNTER
Call placed to daughter and she verbalized understanding. Call placed to [facility] and they requested we fax the order over.  Order faxed to [phone]

## 2020-04-08 NOTE — TELEPHONE ENCOUNTER
PT REALLY DEPRESSED WITH BEING LOCKED INSIDE NURSING HOME DUE TO COVID SITUATION, CAN DR PRESCRIBE ANTIDEPRESSANT FOR HER?

## 2020-04-21 ENCOUNTER — MED REC SCAN ONLY (OUTPATIENT)
Dept: FAMILY MEDICINE CLINIC | Facility: CLINIC | Age: 85
End: 2020-04-21

## 2020-06-06 ENCOUNTER — TELEPHONE (OUTPATIENT)
Dept: FAMILY MEDICINE CLINIC | Facility: CLINIC | Age: 85
End: 2020-06-06

## 2020-06-06 ENCOUNTER — APPOINTMENT (OUTPATIENT)
Dept: CT IMAGING | Age: 85
End: 2020-06-06
Attending: FAMILY MEDICINE
Payer: MEDICARE

## 2020-06-06 ENCOUNTER — HOSPITAL ENCOUNTER (OUTPATIENT)
Age: 85
Discharge: HOME | End: 2020-06-06
Attending: FAMILY MEDICINE
Payer: MEDICARE

## 2020-06-06 VITALS
SYSTOLIC BLOOD PRESSURE: 130 MMHG | DIASTOLIC BLOOD PRESSURE: 60 MMHG | OXYGEN SATURATION: 97 % | RESPIRATION RATE: 16 BRPM | HEART RATE: 80 BPM | TEMPERATURE: 99 F

## 2020-06-06 DIAGNOSIS — S01.511A LIP LACERATION, INITIAL ENCOUNTER: ICD-10-CM

## 2020-06-06 DIAGNOSIS — S01.81XA CHIN LACERATION, INITIAL ENCOUNTER: ICD-10-CM

## 2020-06-06 DIAGNOSIS — S09.90XA CLOSED HEAD INJURY, INITIAL ENCOUNTER: Primary | ICD-10-CM

## 2020-06-06 DIAGNOSIS — S09.93XA FACIAL INJURY, INITIAL ENCOUNTER: ICD-10-CM
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
## 2020-06-06 PROCEDURE — 99214 OFFICE O/P EST MOD 30 MIN: CPT

## 2020-06-06 PROCEDURE — 70450 CT HEAD/BRAIN W/O DYE: CPT | Performed by: FAMILY MEDICINE

## 2020-06-06 PROCEDURE — 70486 CT MAXILLOFACIAL W/O DYE: CPT | Performed by: FAMILY MEDICINE

## 2020-06-06 PROCEDURE — 76376 3D RENDER W/INTRP POSTPROCES: CPT | Performed by: FAMILY MEDICINE

## 2020-06-06 PROCEDURE — 12011 RPR F/E/E/N/L/M 2.5 CM/<: CPT

## 2020-06-06 NOTE — ED INITIAL ASSESSMENT (HPI)
Per daughter pt has alzheimers and had unwitnessed fall during the night. States she was checked on at 0200 and was sleeping. At 0500 was in bed with blood all over her face. Pt conscious and alert to  Norm.  Pt arrived with swelling to her left temporal, s

## 2020-06-06 NOTE — ED PROVIDER NOTES
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     Physical Exam     ED Triage Vitals [06/06/20 1522]   /60   Pulse 80   Resp 16   Temp 99.1 °F (37.3 °C)   Temp src Temporal   SpO2 97 %   O2 Device None (Room air)       Current:/60   Pulse 80   Temp 99.1 °F (37.3 °C) (Temporal) symmetric reflexes. Normal coordination and gait  Mental status: Patient is alert but nonverbal.  Cranial nerves: normal  Sensory: normal  Motor:grossly normal  Reflexes: 2+ and symmetric  Coordination: normal  Gait: Not examined.   Patient was on a wheelch (cpt=70486)    Result Date: 6/6/2020  PROCEDURE:  CT FACIAL BONES (CPT=70486)  COMPARISON:  None. INDICATIONS:  fall-head and facial injury  TECHNIQUE:  Noncontrast CT scanning is performed through the facial bones.  3D shaded surface renderings are create LET  Type of anesthesia: local  Type of suture material: suture   Number of staples: 3  Result: wound approximated well  Patient tolerated procedure well  Wound was dressed with Neosporin ointment   Tetanus status: Up to date   Antiobitic prophylaxis: no Prescribed:  Current Discharge Medication List

## 2020-06-08 ENCOUNTER — OFFICE VISIT (OUTPATIENT)
Dept: FAMILY MEDICINE CLINIC | Facility: CLINIC | Age: 85
End: 2020-06-08
Payer: COMMERCIAL

## 2020-06-08 VITALS
OXYGEN SATURATION: 96 % | HEART RATE: 70 BPM | HEIGHT: 61 IN | RESPIRATION RATE: 12 BRPM | WEIGHT: 136 LBS | BODY MASS INDEX: 25.68 KG/M2 | TEMPERATURE: 99 F | SYSTOLIC BLOOD PRESSURE: 108 MMHG | DIASTOLIC BLOOD PRESSURE: 60 MMHG

## 2020-06-08 DIAGNOSIS — G30.9 ALZHEIMER'S DEMENTIA WITH BEHAVIORAL DISTURBANCE, UNSPECIFIED TIMING OF DEMENTIA ONSET (HCC): ICD-10-CM

## 2020-06-08 DIAGNOSIS — F02.81 ALZHEIMER'S DEMENTIA WITH BEHAVIORAL DISTURBANCE, UNSPECIFIED TIMING OF DEMENTIA ONSET (HCC): ICD-10-CM

## 2020-06-08 DIAGNOSIS — G30.9 ALZHEIMER'S DEMENTIA WITHOUT BEHAVIORAL DISTURBANCE, UNSPECIFIED TIMING OF DEMENTIA ONSET (HCC): ICD-10-CM

## 2020-06-08 DIAGNOSIS — R29.6 FREQUENT FALLS: ICD-10-CM

## 2020-06-08 DIAGNOSIS — S01.81XD FACIAL LACERATION, SUBSEQUENT ENCOUNTER: Primary | ICD-10-CM

## 2020-06-08 DIAGNOSIS — R53.83 FATIGUE, UNSPECIFIED TYPE: ICD-10-CM

## 2020-06-08 DIAGNOSIS — I10 ESSENTIAL HYPERTENSION: ICD-10-CM

## 2020-06-08 DIAGNOSIS — S00.83XD FACIAL CONTUSION, SUBSEQUENT ENCOUNTER: ICD-10-CM

## 2020-06-08 DIAGNOSIS — F02.80 ALZHEIMER'S DEMENTIA WITHOUT BEHAVIORAL DISTURBANCE, UNSPECIFIED TIMING OF DEMENTIA ONSET (HCC): ICD-10-CM

## 2020-06-08 DIAGNOSIS — N18.30 STAGE 3 CHRONIC KIDNEY DISEASE (HCC): ICD-10-CM

## 2020-06-08 PROCEDURE — 82607 VITAMIN B-12: CPT | Performed by: NURSE PRACTITIONER

## 2020-06-08 PROCEDURE — 80053 COMPREHEN METABOLIC PANEL: CPT | Performed by: NURSE PRACTITIONER

## 2020-06-08 PROCEDURE — 99214 OFFICE O/P EST MOD 30 MIN: CPT | Performed by: FAMILY MEDICINE

## 2020-06-08 PROCEDURE — 85025 COMPLETE CBC W/AUTO DIFF WBC: CPT | Performed by: NURSE PRACTITIONER

## 2020-06-08 PROCEDURE — 82746 ASSAY OF FOLIC ACID SERUM: CPT | Performed by: NURSE PRACTITIONER

## 2020-06-08 RX ORDER — NYSTATIN 10B UNIT
POWDER (EA) MISCELLANEOUS
Refills: 0 | COMMUNITY
Start: 2020-06-08

## 2020-06-08 NOTE — PATIENT INSTRUCTIONS
Vaseline application to wound and lips 3 times daily as needed. Wound care. Cool compresses 3 times daily x15 minutes. Follow-up on Friday for suture removal.  Call with questions or problems.

## 2020-06-08 NOTE — PROGRESS NOTES
HPI:    Patient ID: Siddharth Hobson is a 81year old female. Sat early AM middle night  Seen UC on Saturday afternoon. Seems to be doing okay per daughter. Breathing comfortably. Eating okay. Not complaining of pain.   Without change in mentation per d HOURS 28 tablet 11   • carvedilol (COREG) 25 MG Oral Tab Take 1 tablet (25 mg total) by mouth 2 (two) times daily with meals. 60 tablet 2   • aspirin 81 MG Oral Tab EC Take 1 tablet (81 mg total) by mouth daily.  30 tablet 11   • omeprazole 20 MG Oral Capsu cervical adenopathy. Vitals reviewed.     /60   Pulse 70   Temp 98.5 °F (36.9 °C) (Oral)   Resp 12   Ht 61\"   Wt 136 lb (61.7 kg)   SpO2 96%   BMI 25.70 kg/m²            ASSESSMENT/PLAN:   Essential hypertension  Fatigue, unspecified type  Stage 3

## 2020-06-08 NOTE — TELEPHONE ENCOUNTER
Daughter has made appt for this morning with Dr Master Talley. Fax received from [facility] that she has a rash/irritation in groin area that is very itchy. May we have an order for Nystatin cream or powder?   Advised- Nystatin powder TID to groin rash for 10 da

## 2020-06-12 ENCOUNTER — TELEPHONE (OUTPATIENT)
Dept: FAMILY MEDICINE CLINIC | Facility: CLINIC | Age: 85
End: 2020-06-12

## 2020-06-12 NOTE — PROGRESS NOTES
HPI:   Jessica Borjas is a 81year old female who presents for a MA (Medicare Advantage) Supervisit (Once per calendar year). W/O Problems with wound. Healing well. Concern with blood pressure. Has lost weight.   Dizziness question lightheadedness wi without help  She has difficulties Managing Money/Bills based on screening of functional status. Managing money/bills: Cannot do without help  She has difficulties Shopping for Groceries based on screening of functional status.    Shop for groceries: [employer] Stage 3 chronic kidney disease (HCC)     Seborrheic dermatitis of scalp     Eczema     History of acute pancreatitis     Frequent falls    Wt Readings from Last 3 Encounters:  06/12/20 : 137 lb (62.1 kg)  06/08/20 : 136 lb (61.7 kg)  01/02/20 : 142 lb (64. Tab, Take 1 tablet by mouth daily. AmLODIPine Besylate 2.5 MG Oral Tab, Take 2.5 mg by mouth daily. Memantine HCl 5 MG Oral Tab, Take 1 tablet by mouth nightly.   furosemide 40 MG Oral Tab, 1 po q am  QUETIAPINE FUMARATE 25 MG Oral Tab, TAKE 1 TABLET BY M dysuria, vaginal discharge or itching, no complaint of urinary incontinence   MUSCULOSKELETAL: denies back pain  NEURO: denies headaches  PSYCHE: Stable per daughter depression or anxiety  HEMATOLOGIC: denies hx of anemia  ENDOCRINE: denies thyroid history History   Administered Date(s) Administered   • FLU VACC High Dose 65 YRS & Older PRSV Free (45165) 09/27/2015, 10/20/2016, 10/11/2018, 11/01/2019   • Pneumococcal (Prevnar 13) 04/30/2016   • Pneumovax 23 03/16/2019        ASSESSMENT AND OTHER RELEVANT CHR lifestyle, and exercise. Continue with current treatment plan. No follow-ups on file.      Jayy Ponce DO, 6/12/2020     General Health     Has your appetite been poor?: No  How does the patient maintain a good energy level?: Appropriate Exercise;Stret Health Maintenance if applicable    Chlamydia  Annually if high risk No results found for: CHLAMYDIA No flowsheet data found.     Screening Mammogram      Mammogram Annually to 77, then as discussed There are no preventive care reminders to display for this

## 2020-06-12 NOTE — PATIENT INSTRUCTIONS
Shakira Pedroza's SCREENING SCHEDULE   Tests on this list are recommended by your physician but may not be covered, or covered at this frequency, by your insurer. Please check with your insurance carrier before scheduling to verify coverage.    PREVENTATI often if abnormal There are no preventive care reminders to display for this patient. Update Health Maintenance if applicable    Flex Sigmoidoscopy Screen  Covered every 5 years No results found for this or any previous visit. No flowsheet data found. 04/30/16   • PNEUMOCOCCAL VACC, 13 KAREN IM    Please get once after your 65th birthday    Pneumococcal 23 (Pneumovax)  Covered Once after 65 Orders placed or performed in visit on 03/16/19   • PNEUMOCOCCAL IMM (PNEUMOVAX)    Please get once after your 65th

## 2020-06-12 NOTE — TELEPHONE ENCOUNTER
Orders to peter/katherin- monitor B/P daily and call or fax readings in 1 week. V/o Dr Rizzo. Shahram Lawson RN    Order faxed to [facility]

## 2020-06-18 ENCOUNTER — TELEPHONE (OUTPATIENT)
Dept: FAMILY MEDICINE CLINIC | Facility: CLINIC | Age: 85
End: 2020-06-18

## 2020-08-09 ENCOUNTER — TELEPHONE (OUTPATIENT)
Dept: FAMILY MEDICINE CLINIC | Facility: CLINIC | Age: 85
End: 2020-08-09

## 2020-08-09 NOTE — TELEPHONE ENCOUNTER
Sven Camp at [facility] last night, reports her head hurts. Daughter is going to see her. Hig risk falls, no anticoagulants. I think nuerochecks tonight and if changes in status get immediate care.

## 2020-08-26 NOTE — TELEPHONE ENCOUNTER
Call Mack Trujillo about adrianna taking med's. Did the nursing home call to have her med's crushed?

## 2020-08-27 NOTE — TELEPHONE ENCOUNTER
DAUGHTER CONCERNED THAT NURSE AT [facility] ARE CRUSHING NAMENDA  BECAUSE SHE IS OVERSEDATED AGAIN. DR Tristan Madsen DID OK SPEECH THERAPY EVALUATION AND TO DO SWALLOW STUDY IF INDICATED. WILL REFAX THESE ORDERS TO [facility].     CALLED DAUGHTER, SHE ASKS IF

## 2020-08-28 PROBLEM — D51.0 PA (PERNICIOUS ANEMIA): Status: ACTIVE | Noted: 2020-08-28

## 2020-08-28 PROBLEM — E55.9 HYPOVITAMINOSIS D: Status: ACTIVE | Noted: 2020-08-28

## 2020-08-28 PROBLEM — K52.9 COLITIS: Status: ACTIVE | Noted: 2020-08-28

## 2020-08-28 NOTE — TELEPHONE ENCOUNTER
First called customer service # [phone] to request Tier reduction for Omeprazole oral suspension 2 mg/ml, and Colestipol 5 g. Oral packet; transferred to [facility] prescription line and spoke to Misael Fuentes.   Requested tier reduction for above 2 medications,

## 2020-09-02 NOTE — TELEPHONE ENCOUNTER
Daughter Darlyn Neely said that she has been \"going back and forth\" with the nursing home about crushing the medication. She said they are not supposed to crush and patient should not be chewing her medications. She said that they will monitor her.  She said

## 2020-09-03 ENCOUNTER — TELEPHONE (OUTPATIENT)
Dept: FAMILY MEDICINE CLINIC | Facility: CLINIC | Age: 85
End: 2020-09-03

## 2020-09-03 DIAGNOSIS — K21.9 GASTROESOPHAGEAL REFLUX DISEASE, ESOPHAGITIS PRESENCE NOT SPECIFIED: Primary | ICD-10-CM

## 2020-09-03 RX ORDER — OMEPRAZOLE 20 MG/1
20 CAPSULE, DELAYED RELEASE ORAL
Qty: 30 CAPSULE | Refills: 5 | Status: SHIPPED | OUTPATIENT
Start: 2020-09-03

## 2020-09-03 NOTE — TELEPHONE ENCOUNTER
QUESTIONS ON OMEPRAZOLE. THEY HAVE BEEN SPEAKING WITH THE PHARMACY AND THEY INFORMED THEM THEY CAN OPEN UP THE PILL AND SPRINKLE IT ON APPLESAUCE. DR. Edgar White WOULD HAVE TO UPDATE THE ORDER

## 2020-09-03 NOTE — TELEPHONE ENCOUNTER
Notified by fax tier reduction for omeprazole and colestipol was denied.   Orders faxed to Joshua/katherin; see below regarding seroquel

## 2020-09-03 NOTE — TELEPHONE ENCOUNTER
DAUGHTER, ROBIN, NOTIFIED OF NO LOWERING FOR TIER FOR MEDS- OMEPRAZOLE AND COLESTIPOL. DAUGHTER HAD RECEIVED NOTIFICATION IN MAIL.   SHE JUST GOT OFF [facility] AND SHE SPOKE TO THEIR PHARMACY THAT THEY COULD ORDER A DIFFERENT T

## 2020-09-10 ENCOUNTER — TELEPHONE (OUTPATIENT)
Dept: FAMILY MEDICINE CLINIC | Facility: CLINIC | Age: 85
End: 2020-09-10

## 2020-09-10 NOTE — TELEPHONE ENCOUNTER
Spoke with Amelia Leavitt (daughter per hipaa) regarding urine culture results being neg.  Daughter verified understanding and relayed that her mother is doing great cognitively and is much more active after the adjustment of medication that has recently been mad

## 2020-09-28 ENCOUNTER — TELEPHONE (OUTPATIENT)
Dept: FAMILY MEDICINE CLINIC | Facility: CLINIC | Age: 85
End: 2020-09-28

## 2020-09-28 DIAGNOSIS — D51.0 ANEMIA, PERNICIOUS: Primary | ICD-10-CM

## 2020-09-28 DIAGNOSIS — I77.9 BILATERAL CAROTID ARTERY DISEASE, UNSPECIFIED TYPE (HCC): ICD-10-CM

## 2020-09-28 DIAGNOSIS — E55.9 VITAMIN D DEFICIENCY: ICD-10-CM

## 2020-09-28 DIAGNOSIS — J30.89 NON-SEASONAL ALLERGIC RHINITIS DUE TO OTHER ALLERGIC TRIGGER: ICD-10-CM

## 2020-09-28 DIAGNOSIS — E87.6 HYPOKALEMIA: ICD-10-CM

## 2020-09-28 DIAGNOSIS — I87.2 VENOUS INSUFFICIENCY OF BOTH LOWER EXTREMITIES: ICD-10-CM

## 2020-09-28 DIAGNOSIS — D51.0 PA (PERNICIOUS ANEMIA): ICD-10-CM

## 2020-09-28 DIAGNOSIS — J32.9 CHRONIC SINUSITIS, UNSPECIFIED LOCATION: ICD-10-CM

## 2020-09-28 NOTE — TELEPHONE ENCOUNTER
Daughter comments her Mother was definitely over medicated; she is much better as far as alertness; but does see she has moments when uncooperative in taking medications. Daughter is agreeable if any meds can be changed to liquid the please do so.   Also,

## 2020-09-29 RX ORDER — CYANOCOBALAMIN (VITAMIN B-12) 1000 MCG
500 TABLET, SUBLINGUAL SUBLINGUAL EVERY OTHER DAY
COMMUNITY
Start: 2020-09-29

## 2020-09-29 RX ORDER — DIPHENHYDRAMINE HCL 12.5MG/5ML
25 LIQUID (ML) ORAL EVERY 6 HOURS PRN
Refills: 0 | COMMUNITY
Start: 2020-09-29

## 2020-09-29 RX ORDER — ASPIRIN 81 MG/1
81 TABLET, CHEWABLE ORAL
Refills: 0 | COMMUNITY
Start: 2020-09-29

## 2020-09-29 RX ORDER — FUROSEMIDE 10 MG/ML
40 SOLUTION ORAL DAILY
Refills: 0 | COMMUNITY
Start: 2020-09-29

## 2020-09-29 RX ORDER — LORATADINE ORAL 5 MG/5ML
10 SOLUTION ORAL
Qty: 300 ML | Refills: 0 | COMMUNITY
Start: 2020-09-29

## 2020-09-29 RX ORDER — ARIPIPRAZOLE 15 MG/1
10 TABLET ORAL DAILY
Refills: 0 | COMMUNITY
Start: 2020-09-29 | End: 2022-01-22

## 2020-09-29 NOTE — TELEPHONE ENCOUNTER
Confirmed with nurse Cristel Menendez at [facility] they received fax with med changes. To let us know if this helps with her taking medications. Daughter, Juan Manuel Watkins, notified of changes.

## 2020-09-29 NOTE — TELEPHONE ENCOUNTER
DR Quita Cervantes REVIEWED MED LIST AND RECOMMENDS FOLLOWING CHANGES-  Discontinue Simvastatin  See med list for changes

## 2020-09-30 NOTE — TELEPHONE ENCOUNTER
DAUGHTER, ROLANDA CALLING AND HAS SOME QUESTIONS RE: HER MOTHERS MEDS AND WHAT MEDS CAN BE CRUSHED AND WHAT MEDS CANNOT BE CRUSHED. SHE IS AWARE THIS WILL PROB BE ADDRESSED TOMORROW.

## 2020-10-01 ENCOUNTER — TELEPHONE (OUTPATIENT)
Dept: FAMILY MEDICINE CLINIC | Facility: CLINIC | Age: 85
End: 2020-10-01

## 2020-10-01 NOTE — TELEPHONE ENCOUNTER
Zac Harper with [facility] is calling she needs the ok to use the lasix tab until the liquid is available.

## 2020-10-01 NOTE — TELEPHONE ENCOUNTER
PER NURSE AT [facility]- THEIR PHARMACY UNABLE TO PROVIDE ORAL SOLUTION FOR NAMENDA; BUT THEY ARE SAYING OK TO CRUSH NAMENDA 5 MG TAB. THEY GAVE DAUGHTER INFORMATION YESTERDAY REGARDING SITUATION.

## 2020-10-01 NOTE — TELEPHONE ENCOUNTER
SPOKE TO DAUGHTER, SHE IS WAITING FOR CALL BACK FROM DON TO DISCUSS MEDICATION SITUATION. SHE IS BEING TOLD BY [facility] THEIR PHARMACY SAID TO CRUSH EVERYTHING-  CAN AMLODIPINE AND CARVEDILOL BE CRUSHED?   CALLED ALYSHA, PHARMACIST AT [facility], AND HE SAID

## 2020-11-03 ENCOUNTER — MED REC SCAN ONLY (OUTPATIENT)
Dept: FAMILY MEDICINE CLINIC | Facility: CLINIC | Age: 85
End: 2020-11-03

## 2020-11-06 ENCOUNTER — TELEPHONE (OUTPATIENT)
Dept: FAMILY MEDICINE CLINIC | Facility: CLINIC | Age: 85
End: 2020-11-06

## 2020-11-06 DIAGNOSIS — U07.1 COVID-19 VIRUS INFECTION: Primary | ICD-10-CM

## 2020-11-06 RX ORDER — ZINC 25 MG
1 TABLET ORAL DAILY
Qty: 90 TABLET | Refills: 3 | COMMUNITY
Start: 2020-11-06 | End: 2020-12-06

## 2020-11-07 NOTE — TELEPHONE ENCOUNTER
Update- sleeping more, muscle aches,not eating, still some diarrhea. Update to Dr Abraham Gutierrez. Any new orders?

## 2020-11-30 ENCOUNTER — TELEPHONE (OUTPATIENT)
Dept: FAMILY MEDICINE CLINIC | Facility: CLINIC | Age: 85
End: 2020-11-30

## 2020-11-30 NOTE — TELEPHONE ENCOUNTER
The hospitalist called her with update. She has a severe UTI, low hbg. Hospitalist suggesting a nursing home instead of [facility]. Juan Morel really against that for several reasons. She's going to \"take it 1 day at a time\". Dr. Ramiro Rossi aware.

## 2020-11-30 NOTE — TELEPHONE ENCOUNTER
Information in [place]. Indicates someone talked with Pamela Elder.  She fell while at [facility], she's covid +etc.

## 2020-11-30 NOTE — TELEPHONE ENCOUNTER
Her mom is in [facility] and Stuart Cobian can't get anyone to call her back and wants to know if you can call there to get some information for Stuart Cobian

## 2020-12-01 ENCOUNTER — TELEPHONE (OUTPATIENT)
Dept: FAMILY MEDICINE CLINIC | Facility: CLINIC | Age: 85
End: 2020-12-01

## 2020-12-01 DIAGNOSIS — F02.81 ALZHEIMER'S DEMENTIA WITH BEHAVIORAL DISTURBANCE, UNSPECIFIED TIMING OF DEMENTIA ONSET (HCC): ICD-10-CM

## 2020-12-01 DIAGNOSIS — G30.9 ALZHEIMER'S DEMENTIA WITH BEHAVIORAL DISTURBANCE, UNSPECIFIED TIMING OF DEMENTIA ONSET (HCC): ICD-10-CM

## 2020-12-01 RX ORDER — QUETIAPINE 25 MG/1
25 TABLET, FILM COATED ORAL NIGHTLY
Qty: 28 TABLET | Refills: 11 | COMMUNITY
Start: 2020-12-01

## 2020-12-01 NOTE — TELEPHONE ENCOUNTER
Juan Manuel Roland was able to view her Mom's record on the [address]. She noted that the medication list was not accurate. She requested we fax a current copy of the meds to them. Fax # [phone]. We reviewed the list, printed and faxed.

## 2020-12-01 NOTE — TELEPHONE ENCOUNTER
Elinor Saunders is calling [facility] is currently in [facility] in [facility], they do not have a current medication list.  Elinor Saunders is going to call to get a fax number for us to send it to and she will provide this for you when you call her.

## 2020-12-04 ENCOUNTER — TELEPHONE (OUTPATIENT)
Dept: FAMILY MEDICINE CLINIC | Facility: CLINIC | Age: 85
End: 2020-12-04

## 2020-12-04 DIAGNOSIS — D51.0 ANEMIA, PERNICIOUS: Primary | ICD-10-CM

## 2020-12-04 NOTE — TELEPHONE ENCOUNTER
REVIEWED BY DR Tg Kulkarni- RESULTS ARE NORMAL. DAUGHTER NOTIFIED.   STATES HER HGB WAS LOW-    12/1/2020 11/30/2020 11/29/2020     6.7 6.6 6.5   3.22 (L) 2.95 (L) 3.10 (L)   9.5 (L) 8.7 (L) 9.1 (L)   28.6 (L) 25.9 (L) 27.3 (L)   89.0 88.0 88.1   29.4 29.5 29.2

## 2020-12-04 NOTE — TELEPHONE ENCOUNTER
Ordered Prescriptions  - documented in this encounter  Reconcile with Patient's Chart  Prescription Sig Dispensed Refills Start Date End Date   ferrous sulfate 325 mg (65 mg iron) PO enteric coated tablet   take 1 tablet by mouth two times daily.  90 tablet

## 2020-12-05 RX ORDER — FOLIC ACID 1 MG/1
1 TABLET ORAL DAILY
COMMUNITY
Start: 2020-12-03 | End: 2020-12-05

## 2020-12-05 RX ORDER — FERROUS SULFATE 325(65) MG
325 TABLET ORAL 2 TIMES DAILY
COMMUNITY
Start: 2020-12-01 | End: 2020-12-05

## 2020-12-05 RX ORDER — FOLIC ACID 1 MG/1
1 TABLET ORAL DAILY
Refills: 0 | COMMUNITY
Start: 2020-12-05

## 2020-12-05 RX ORDER — FERROUS SULFATE 325(65) MG
325 TABLET ORAL 2 TIMES DAILY
Refills: 0 | COMMUNITY
Start: 2020-12-05

## 2020-12-08 ENCOUNTER — TELEPHONE (OUTPATIENT)
Dept: FAMILY MEDICINE CLINIC | Facility: CLINIC | Age: 85
End: 2020-12-08

## 2020-12-08 NOTE — TELEPHONE ENCOUNTER
Condition update- resident has had O2 Sat below 90%; would you like to have her on oxygen at this time? Or PRN? Advised- order written per Dr Angelique Ocampo  For oxygen at 2 L per NC, start at 2 L. Maintain O2 greater than 90%.   Order faxed to [facility]

## 2020-12-09 ENCOUNTER — TELEPHONE (OUTPATIENT)
Dept: FAMILY MEDICINE CLINIC | Facility: CLINIC | Age: 85
End: 2020-12-09

## 2020-12-14 ENCOUNTER — TELEPHONE (OUTPATIENT)
Dept: FAMILY MEDICINE CLINIC | Facility: CLINIC | Age: 85
End: 2020-12-14

## 2020-12-14 NOTE — TELEPHONE ENCOUNTER
Caroline Pruett with [facility] would like to give a update with how Israel Rodriguez is doing.   Please call

## 2021-01-12 ENCOUNTER — TELEPHONE (OUTPATIENT)
Dept: FAMILY MEDICINE CLINIC | Facility: CLINIC | Age: 86
End: 2021-01-12

## 2021-01-12 NOTE — TELEPHONE ENCOUNTER
They are starting PT & OT rehab services for this patient. Would like to speak with a nurse regarding patient and her services.

## 2021-01-12 NOTE — TELEPHONE ENCOUNTER
[facility] BARIATRIC [facility] BARIATRIC SURGERY  [address] [id number] [phone]  [address] [id number] [phone]  Dept: [phone]  1/12/2021      Angie Patel.  [id number]  [id number]  1975  female      Chief Complaint of weight gain; unable to maintain weight loss    History of Present Illness:   Angie is a 45 y.o. female who presents today for evaluation, education and consultation regarding weight loss surgery. The patient is interested in the sleeve gastrectomy.      Diet History:Angie has been overweight for at least 20 years, has been 35 pounds or more overweight for at least 20 years, has been 100 pounds or more overweight for 15 or more years and started dieting at age 21.  The most weight Angie lost was 35-40 pounds on phentermine and reduced calorie diet and maintained the weight loss for 4-6 months. Angie describes her eating habits as snacking without portion control, eating to cope with boredom. Angie Patel has tried Fasting, reduced calorie, exercising, prescription medications, Ketogenic or high fat and intermittent fasting among others with success of losing up to 35-40 pounds, but in each instance regained the weight.     See dietician documentation for complete history.    Bariatric Surgery Evaluation: The patient is being seen for an initial visit for bariatric surgery evaluation.     Bariatric Co-morbidities:  sleep apnea, diabetes, dyslipidemia, back pain, knee pain, GERD, polycystic ovarian disease and urinary stress incontinence    Patient Active Problem List   Diagnosis   • Magnetic resonance imaging of brain abnormal   • Benign essential hypertension   • Fatigue   • Abnormal gait   • Hypercholesterolemia   • Meningioma (CMS/HCC)   • Migraine   • Obesity, Class III, BMI 40-49.9 (morbid obesity) (CMS/HCC)   • Vitamin D insufficiency   • Memory changes   • PCOS (polycystic ovarian syndrome)   •  Orders called to Naomie Martin- will change Lorazepam to 1 to 2 tabs po BID, PRN. Will call with update in 1 week. Menopausal symptoms   • Insulin resistance syndrome   • Right ureteral stone   • Constipation due to pain medication therapy   • Blurred vision, bilateral   • Anxiety   • History of kidney stones   • Fibromyalgia   • Primary insomnia   • Multiple joint pain   • Chronic cough   • Thyroid enlargement   • Left-sided chest pain   • Neurological abnormality   • KACI (obstructive sleep apnea)   • Hypokalemia   • Left hip pain   • Hypertension   • Intermittent claudication (CMS/HCC)   • Heartburn   • Urinary, incontinence, stress female   • Diabetes mellitus type 2 in obese (CMS/HCC)   • Peripartum cardiomyopathy   • Essential hypertension   • Polycystic ovaries   • Prediabetes   • Use of cane as ambulatory aid       Past Medical History:   Diagnosis Date   • Anxiety    • Asthma    • Diabetes mellitus (CMS/HCC)    • Fibromyalgia    • Fibromyalgia, primary    • Headache, post-lumbar puncture 9/4/2020   • Headache, tension-type    • Hypertension    • Kidney calculi    • Memory loss    • Meningioma (CMS/HCC)    • Migraine     ocular   • KACI (obstructive sleep apnea)     On CPAP   • Palpitations    • Polycystic ovaries    • Postpartum cardiomyopathy 2002   • Preeclampsia 2002   • Seizures (CMS/HCC)        Past Surgical History:   Procedure Laterality Date   • CYSTOSCOPY URETEROSCOPY LASER LITHOTRIPSY Right 3/7/2017    Procedure: CYSTOSCOPY RT URETEROSCOPY LASER LITHOTRIPSY W/ STENT, rerograde pyelogram ;  Surgeon: Rashid Malloy MD;  Location: [facility] OR;  Service:    • FOOT SURGERY Left 2014    tendon repair   • HYSTERECTOMY  2007   • OOPHORECTOMY Left 2011    for ovarian cysts   • SHOULDER SURGERY Right 2009   • TUBAL ABDOMINAL LIGATION  2003   • WISDOM TOOTH EXTRACTION  1999       Allergies   Allergen Reactions   • Iodine Shortness Of Breath     Swelling     • Shellfish Allergy Shortness Of Breath     swelling   • Lyrica [Pregabalin] Other (See Comments)     Mood swings, burning in legs          Current Outpatient  Medications:   •  Blood Glucose Monitoring Suppl (ACCU-CHEK MANNY) device, Used to check blood sugars twice daily as needed for type 2 diabetes, Disp: 1 each, Rfl: 0  •  chlorthalidone (HYGROTON) 25 MG tablet, Take 1 tablet by mouth Daily., Disp: 90 tablet, Rfl: 3  •  furosemide (LASIX) 20 MG tablet, Take 20 mg by mouth As Needed., Disp: , Rfl:   •  glucose blood (Accu-Chek Manny) test strip, Used to check blood sugars twice daily as needed for type 2 diabetes, Disp: 100 each, Rfl: 12  •  ibuprofen (MOTRIN IB) 200 MG tablet, Take 2 tablets by mouth Every 6 (Six) Hours As Needed., Disp: , Rfl:   •  Lancets (ACCU-CHEK MULTICLIX) lancets, Used to check blood sugars twice daily as needed for type 2 diabetes, Disp: 100 each, Rfl: 12  •  metFORMIN ER (GLUCOPHAGE-XR) 750 MG 24 hr tablet, Take 2 tablets by mouth Daily With Dinner., Disp: 60 tablet, Rfl: 6  •  potassium chloride (K-DUR,KLOR-CON) 10 MEQ CR tablet, Take 1 tablet by mouth Daily., Disp: 30 tablet, Rfl: 0  •  spironolactone (ALDACTONE) 100 MG tablet, TAKE 1 TABLET BY MOUTH DAILY, Disp: 90 tablet, Rfl: 0  •  Ventolin  (90 Base) MCG/ACT inhaler, INHALE 2 PUFFS BY MOUTH FOUR TIMES DAILY AS NEEDED FOR WHEEZING OR SHORTNESS OF BREATH, Disp: 18 g, Rfl: 3    Social History     Socioeconomic History   • Marital status:      Spouse name: Not on file   • Number of children: Not on file   • Years of education: Not on file   • Highest education level: Not on file   Occupational History   • Occupation: dog groomer     Employer: SELF-EMPLOYED     Comment: full time   Tobacco Use   • Smoking status: Former Smoker     Packs/day: 1.00     Years: 20.00     Pack years: 20.00     Types: Cigarettes     Quit date: 2016     Years since quittin.5   • Smokeless tobacco: Never Used   Substance and Sexual Activity   • Alcohol use: Yes     Frequency: Monthly or less     Drinks per session: 1 or 2     Comment: occasion   • Drug use: No   • Sexual activity: Defer      Partners: Male     Birth control/protection: Surgical     Comment: hysterectomy       Family History   Problem Relation Age of Onset   • Heart disease Paternal Aunt    • Diabetes Paternal Aunt    • Diabetes Maternal Grandmother    • Neuropathy Maternal Grandmother    • Thyroid disease Maternal Grandmother    • Heart disease Maternal Grandfather    • Cancer Maternal Grandfather    • Heart disease Paternal Grandfather    • Heart disease Paternal Uncle    • Arthritis Mother    • Migraines Mother    • Thyroid disease Mother    • Obesity Mother    • Lupus Mother    • Seizures Father    • Breast cancer Neg Hx    • Ovarian cancer Neg Hx    • Colon cancer Neg Hx    • Deep vein thrombosis Neg Hx    • Pulmonary embolism Neg Hx          Review of Systems:  Review of Systems   Constitutional: Positive for fatigue.   HENT: Negative.    Respiratory: Negative.    Cardiovascular: Negative for chest pain.   Gastrointestinal: Negative.         Heartburn   Endocrine: Negative.    Genitourinary: Positive for menstrual problem.   Musculoskeletal: Negative for back pain.   Skin: Negative.    Neurological: Negative.    Psychiatric/Behavioral: Negative.        Physical Exam:  Vital Signs:  Weight: 122 kg (270 lb)   Body mass index is 47.84 kg/m².  Temp: 97.1 °F (36.2 °C)   Heart Rate: 118   BP: 142/85     Physical Exam  Vitals signs and nursing note reviewed.   Constitutional:       Appearance: She is well-developed. She is obese.   HENT:      Head: Normocephalic and atraumatic.   Neck:      Musculoskeletal: Normal range of motion.   Cardiovascular:      Rate and Rhythm: Regular rhythm. Tachycardia present.      Pulses: Normal pulses.      Heart sounds: Normal heart sounds.   Pulmonary:      Effort: Pulmonary effort is normal. No respiratory distress.      Breath sounds: Normal breath sounds. No wheezing.   Abdominal:      General: Bowel sounds are normal. There is no distension.      Palpations: Abdomen is soft.      Tenderness: There is  no abdominal tenderness.   Musculoskeletal:         General: No deformity.   Skin:     General: Skin is warm and dry.   Neurological:      Mental Status: She is alert and oriented to person, place, and time.      Gait: Gait abnormal.      Comments: Using rivera as ambulatory aid   Psychiatric:         Behavior: Behavior normal.            Assessment:         Angie Patel is a 45 y.o. year old female with medically complicated severe obesity. Weight: 122 kg (270 lb), Body mass index is 47.84 kg/m². and weight related problems including sleep apnea, diabetes, hypertension, dyslipidemia, cardiovascular disease, back pain, knee pain, GERD, polycystic ovarian disease, menstrual irregularities, mental health disease and urinary stress incontinence.    I explained in detail the procedures that we are performing.  All of those procedures can be performed laparoscopically but there is a chance to convert to open if any technical challenges or complications do occur.  Bariatric surgery is not cosmetic surgery but rather a tool to help a patient make a life-long commitment lifestyle changes including diet, exercise, behavior changes, and taking supplemental vitamins and minerals.    Due to the patient's BMI and co-morbidities they are at a high risk for surgery and will obtain the following:  The patient has been advised that a letter of medical support and a history and physical must be obtained from her primary care physician. A psychological evaluation will be arranged for this patient. CBC, CMP, FLP, TSH and HgbA1C will be drawn and patient will be notified with results. Angie Patel will obtain a pre-operative CXR and EKG. Angie Patel has already been cleared by cardiology for surgery..    A pre-operative diagnostic esophagogastroduodenoscopy with biopsy for evaluation will be ordered and scheduled for this patient. The risks and benefits of the procedure were discussed with the patient in detail and all questions  were answered.  Possibility of perforation, bleeding, aspiration, anoxic brain injury, respiratory and/or cardiac arrest and death were discussed.   She received handouts regarding, all questions were answered.     Angie Patel verbalized understanding related to COVID-19 pre-procedure testing policies and has consented to a preoperative test 48-72 hours before She's scheduled EGD and bariatric surgical procedure. The risks and benefits of the procedure were discussed with the patient in detail and all questions were answered.  Possibility of perforation, bleeding, aspiration, anoxic brain injury, respiratory and/or cardiac arrest and death were discussed.   He received handouts regarding, all questions were answered and informed consent was obtained.     The risks, benefits, alternatives, and potential complications of all of the procedures were explained in detail including, but not limited to death, anesthesia and medication adverse effect/DVT, pulmonary embolism, trocar site/incisional hernia, wound infection, abdominal infection, bleeding, failure to lose weight or gain weight and change in body image, metabolic complications with calcium, thiamine, vitamin B12, folate, iron, and anemia.    The patient was advised to start a high protein, low fat and low carbohydrate diet. The patient was given individualized information by our dietician along with handouts.     The patient was given information regarding the OSITO educational video. OSITO is an internet based educational video which explains the surgical procedure and answers basic questions regarding the procedure. The patient was provided with instructions and a password to watch the video.    The patient was encouraged to start routine exercise including but not limited to 150 minutes per week. The patient received a resistance band along with a handout of exercises.     The consultation plan was reviewed with the patient.    The patient understands the  surgical procedures and the different surgical options that are available.  She understands the lifestyle changes that would be required after surgery and has agreed to participate in a pre-operative and postoperative weight management program.  She also expressed understanding of possible risks, had several questions answered and desires to proceed.    I think she is a good candidate for this surgery, and is interested in a sleeve gastrectomy.    Encounter Diagnoses   Name Primary?   • Obesity, Class III, BMI 40-49.9 (morbid obesity) (CMS/HCC) Yes   • Essential hypertension    • Heartburn    • Hypercholesterolemia    • Polycystic ovaries    • KACI (obstructive sleep apnea)    • Urinary, incontinence, stress female    • Multiple joint pain    • PCOS (polycystic ovarian syndrome)    • Prediabetes    • Chronic fatigue    • Benign essential hypertension    • Pitting edema    • Anxiety    • Diabetes mellitus type 2 in obese (CMS/HCC)    • Peripartum cardiomyopathy    • Use of cane as ambulatory aid        Plan:    Patient will have evaluations and follow up with bariatric dieticians and a psychologist before undergoing a multidisciplinary review of her candidacy.  We also discussed the weight loss requirement and rationale, and other program requirements.      HAN Hernandez  1/12/2021                                             Answers for HPI/ROS submitted by the patient on 1/5/2021   What is the primary reason for your visit?: Other  Please describe your symptoms.: Obesity  Have you had these symptoms before?: Yes  How long have you been having these symptoms?: Greater than 2 weeks

## 2021-01-22 ENCOUNTER — TELEPHONE (OUTPATIENT)
Dept: FAMILY MEDICINE CLINIC | Facility: CLINIC | Age: 86
End: 2021-01-22

## 2021-01-22 NOTE — TELEPHONE ENCOUNTER
Resident was sent to ER on 1/19/2021 with laceration to the head and returned with 2 staples in her head. No order/appt from hospital on when to remove. May we have an order to remove in 7 to 10 days? As well as order for removal kit. Please advise.   A

## 2021-02-06 DIAGNOSIS — Z23 NEED FOR VACCINATION: ICD-10-CM

## 2021-02-08 ENCOUNTER — TELEPHONE (OUTPATIENT)
Dept: FAMILY MEDICINE CLINIC | Facility: CLINIC | Age: 86
End: 2021-02-08

## 2021-02-08 NOTE — TELEPHONE ENCOUNTER
They are treating the patient for PT & ST. They would like to update the nurse or Dr Allyssa Causey on the patients progress.

## 2021-02-08 NOTE — TELEPHONE ENCOUNTER
Blanka Burks is asking if Dr Adan Brito would do a conference call with him- he would like to talk directly to him regarding their therapy practice. He asks againg if you do lunches- told him no. Roe He is doing well; and will be sending PT/OT updates via fax.

## 2021-02-22 ENCOUNTER — TELEPHONE (OUTPATIENT)
Dept: FAMILY MEDICINE CLINIC | Facility: CLINIC | Age: 86
End: 2021-02-22

## 2021-02-22 NOTE — TELEPHONE ENCOUNTER
resident observed on floor 2/21/2021 due to unsteady gait              Resident fell on floor. Resident able to move all four extremities. No redness or bruising noted at this time.   Update to [facility]

## 2021-02-24 ENCOUNTER — TELEPHONE (OUTPATIENT)
Dept: FAMILY MEDICINE CLINIC | Facility: CLINIC | Age: 86
End: 2021-02-24

## 2021-02-24 NOTE — TELEPHONE ENCOUNTER
Received a fax stating \"Resident observed on floor at 4pm. BP: 153/77, HR 80 bpm. No visible injury or pain. Will notify of any changes in condition\". After careful review, Dr. Lj Navarro states \"as above\".

## 2021-03-11 ENCOUNTER — TELEPHONE (OUTPATIENT)
Dept: FAMILY MEDICINE CLINIC | Facility: CLINIC | Age: 86
End: 2021-03-11

## 2021-03-11 NOTE — TELEPHONE ENCOUNTER
Question about her wellness exam that she is due for.  She does not know if she wants to ring her mother in due to her memory loss

## 2021-03-11 NOTE — TELEPHONE ENCOUNTER
She is no longer able to handle her mother and bring her in for appts. She will call us back to schedule her wellness visit when she gets it work out with her schedule.

## 2021-04-14 ENCOUNTER — TELEPHONE (OUTPATIENT)
Dept: FAMILY MEDICINE CLINIC | Facility: CLINIC | Age: 86
End: 2021-04-14

## 2021-04-21 ENCOUNTER — MED REC SCAN ONLY (OUTPATIENT)
Dept: FAMILY MEDICINE CLINIC | Facility: CLINIC | Age: 86
End: 2021-04-21

## 2021-04-22 ENCOUNTER — HOSPITAL ENCOUNTER (EMERGENCY)
Facility: HOSPITAL | Age: 86
Discharge: HOME | End: 2021-04-22
Attending: EMERGENCY MEDICINE
Payer: MEDICARE

## 2021-04-22 ENCOUNTER — APPOINTMENT (OUTPATIENT)
Dept: CT IMAGING | Facility: HOSPITAL | Age: 86
End: 2021-04-22
Attending: EMERGENCY MEDICINE
Payer: MEDICARE

## 2021-04-22 ENCOUNTER — TELEPHONE (OUTPATIENT)
Dept: FAMILY MEDICINE CLINIC | Facility: CLINIC | Age: 86
End: 2021-04-22

## 2021-04-22 VITALS
TEMPERATURE: 98 F | SYSTOLIC BLOOD PRESSURE: 99 MMHG | OXYGEN SATURATION: 100 % | WEIGHT: 130 LBS | RESPIRATION RATE: 18 BRPM | HEIGHT: 60 IN | BODY MASS INDEX: 25.52 KG/M2 | HEART RATE: 61 BPM | DIASTOLIC BLOOD PRESSURE: 53 MMHG
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
## 2021-04-22 DIAGNOSIS — S09.90XA CLOSED HEAD INJURY, INITIAL ENCOUNTER: ICD-10-CM

## 2021-04-22 DIAGNOSIS — S01.01XA LACERATION OF SCALP, INITIAL ENCOUNTER: ICD-10-CM

## 2021-04-22 PROCEDURE — 70450 CT HEAD/BRAIN W/O DYE: CPT | Performed by: EMERGENCY MEDICINE

## 2021-04-22 PROCEDURE — 99284 EMERGENCY DEPT VISIT MOD MDM: CPT | Performed by: EMERGENCY MEDICINE

## 2021-04-22 PROCEDURE — 12001 RPR S/N/AX/GEN/TRNK 2.5CM/<: CPT | Performed by: EMERGENCY MEDICINE

## 2021-04-22 NOTE — ED PROVIDER NOTES
Patient Seen in: [facility] Emergency Department      History   Patient presents with:  Fall  Laceration/Abrasion    Stated Complaint: lives at a [facility] care center, fell and hit head, laceration to head.  Bandage i*    HPI/Subjective:   HPI    Patie Exam    General: Patient is elderly, demented 81-year-old female  HEENT: Normal.  2 to 3 cm laceration in the right parietal occipital region. No bony defect. No midline cervical spine tenderness. Nonicteric sclera. Moist mucous membranes.   No meningis

## 2021-04-22 NOTE — ED INITIAL ASSESSMENT (HPI)
Pt here from memory care unit here after falling and hitting her head.  Pt acting normal per daughter

## 2021-06-11 ENCOUNTER — APPOINTMENT (OUTPATIENT)
Dept: CT IMAGING | Facility: HOSPITAL | Age: 86
End: 2021-06-11
Attending: EMERGENCY MEDICINE
Payer: MEDICARE

## 2021-06-11 ENCOUNTER — HOSPITAL ENCOUNTER (EMERGENCY)
Facility: HOSPITAL | Age: 86
Discharge: HOME | End: 2021-06-11
Attending: EMERGENCY MEDICINE
Payer: MEDICARE

## 2021-06-11 VITALS
SYSTOLIC BLOOD PRESSURE: 142 MMHG | OXYGEN SATURATION: 96 % | HEART RATE: 82 BPM | HEIGHT: 60 IN | RESPIRATION RATE: 18 BRPM | DIASTOLIC BLOOD PRESSURE: 78 MMHG | BODY MASS INDEX: 25.52 KG/M2 | TEMPERATURE: 97 F | WEIGHT: 130 LBS

## 2021-06-11 DIAGNOSIS — S00.03XA CONTUSION OF SCALP, INITIAL ENCOUNTER: Primary | ICD-10-CM

## 2021-06-11 PROCEDURE — 70450 CT HEAD/BRAIN W/O DYE: CPT | Performed by: EMERGENCY MEDICINE

## 2021-06-11 PROCEDURE — 99284 EMERGENCY DEPT VISIT MOD MDM: CPT

## 2021-06-11 PROCEDURE — 72125 CT NECK SPINE W/O DYE: CPT | Performed by: EMERGENCY MEDICINE

## 2021-06-12 NOTE — ED INITIAL ASSESSMENT (HPI)
Patient to ED with witnessed fall in memory care unit. Hematoma to right side of forehead and right posterior head.

## 2021-06-12 NOTE — ED PROVIDER NOTES
Patient Seen in: [facility] Emergency Department      History   Patient presents with:  Fall    Stated Complaint: fall- hit head- no loc-    HPI/Subjective: Patient is poor historian given history of dementia. History obtained from daughter.   HPI Ht 152.4 cm (5')   Wt 59 kg   SpO2 96%   BMI 25.39 kg/m²         Physical Exam  Vitals and nursing note reviewed. Constitutional:       General: She is not in acute distress. Appearance: She is well-developed. She is not toxic-appearing.    HENT: to the CT from 4/22/2021. The 3rd and 4th     ventricles are approximately unchanged. The sulcal     prominence is stable. INTRACRANIAL:  There are no abnormal extraaxial fluid collections. There     is no midline shift.   Mild decreased attenuation i PATIENT STATED HISTORY: (As transcribed by Technologist)  Witnessed fall     at nursing home. Neck pain. FINDINGS:      CRANIOCERVICAL AREA:  No Chiari malformation. PARASPINAL AREA:  No visible mass.       BONES:  Normal alignment wit

## 2021-06-15 ENCOUNTER — HOSPITAL ENCOUNTER (EMERGENCY)
Facility: HOSPITAL | Age: 86
Discharge: HOME | End: 2021-06-15
Attending: EMERGENCY MEDICINE
Payer: MEDICARE

## 2021-06-15 VITALS
DIASTOLIC BLOOD PRESSURE: 72 MMHG | TEMPERATURE: 98 F | BODY MASS INDEX: 24.55 KG/M2 | OXYGEN SATURATION: 96 % | HEIGHT: 61 IN | RESPIRATION RATE: 14 BRPM | HEART RATE: 68 BPM | WEIGHT: 130 LBS | SYSTOLIC BLOOD PRESSURE: 138 MMHG

## 2021-06-15 DIAGNOSIS — S01.01XA LACERATION OF SCALP, INITIAL ENCOUNTER: Primary | ICD-10-CM

## 2021-06-15 PROCEDURE — 99283 EMERGENCY DEPT VISIT LOW MDM: CPT

## 2021-06-15 PROCEDURE — 99282 EMERGENCY DEPT VISIT SF MDM: CPT

## 2021-06-15 PROCEDURE — 12001 RPR S/N/AX/GEN/TRNK 2.5CM/<: CPT

## 2021-06-15 NOTE — ED PROVIDER NOTES
Once  Patient Seen in: [facility] Emergency Department      History   Patient presents with:  Fall  Laceration/Abrasion    Stated Complaint: Duvall Motto and hit head w/ laceration; witnessed fall; denies LOC; denies blood thinn*    HPI/Subjective:   HPI    8 97.9 °F (36.6 °C) (Temporal)   Resp 14   Ht 154.9 cm (5' 1\")   Wt 59 kg   SpO2 96%   BMI 24.56 kg/m²         Physical Exam    Patient is alert nonverbal no acute distress HEENT exam pupils are equal round react light extraocular muscles are intact the sca

## 2021-06-15 NOTE — ED INITIAL ASSESSMENT (HPI)
Daughter here with patient, states that patient is from a [facility] facility in [facility] memory care unit, states that she is always falling, patient has alzheimer's and because of it, she is always confused and is nonverbal, according to amauri

## 2021-06-21 ENCOUNTER — TELEPHONE (OUTPATIENT)
Dept: FAMILY MEDICINE CLINIC | Facility: CLINIC | Age: 86
End: 2021-06-21

## 2021-06-21 NOTE — TELEPHONE ENCOUNTER
Resident observed on 6/19/2021 only half way on recliner. Assisted to floor. No apparent injury. POA notified.   Dr Leonard Grossman aware

## 2021-09-27 ENCOUNTER — TELEPHONE (OUTPATIENT)
Dept: FAMILY MEDICINE CLINIC | Facility: CLINIC | Age: 86
End: 2021-09-27

## 2021-09-29 ENCOUNTER — APPOINTMENT (OUTPATIENT)
Dept: CT IMAGING | Facility: HOSPITAL | Age: 86
End: 2021-09-29
Attending: EMERGENCY MEDICINE
Payer: MEDICARE

## 2021-09-29 ENCOUNTER — HOSPITAL ENCOUNTER (EMERGENCY)
Facility: HOSPITAL | Age: 86
Discharge: HOME | End: 2021-09-29
Attending: EMERGENCY MEDICINE
Payer: MEDICARE

## 2021-09-29 VITALS
WEIGHT: 130 LBS | TEMPERATURE: 97 F | OXYGEN SATURATION: 96 % | RESPIRATION RATE: 17 BRPM | DIASTOLIC BLOOD PRESSURE: 131 MMHG | SYSTOLIC BLOOD PRESSURE: 145 MMHG | HEART RATE: 84 BPM | BODY MASS INDEX: 25 KG/M2
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
## 2021-09-29 DIAGNOSIS — F03.90 ADVANCED DEMENTIA (HCC): ICD-10-CM
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
## 2021-09-29 PROCEDURE — 70450 CT HEAD/BRAIN W/O DYE: CPT | Performed by: EMERGENCY MEDICINE

## 2021-09-29 PROCEDURE — 99284 EMERGENCY DEPT VISIT MOD MDM: CPT

## 2021-09-29 NOTE — CM/SW NOTE
CM asked to provide POLST paper for Cynthia Christiansen who is currently at the bedside. POLST papers completed by MD and signed. POLST scanned in Media.     Alejandrina Tang, MSN RN, [address]  [place] [address]

## 2021-09-29 NOTE — ED PROVIDER NOTES
Patient Seen in: [facility] Emergency Department      History   Patient presents with:  Head Neck Injury    Stated Complaint: lost balance, fell hitting head on carpeted concrete.  deny loc. not on blood th*    Subjective:   HPI    81year-old who li Alcohol/week: 0.0 standard drinks    Drug use: No             Review of Systems    Positive for stated complaint: lost balance, fell hitting head on carpeted concrete. deny loc. not on blood th*  Other systems are as noted in HPI.   Constitutional and vit Index     Registry. FINDINGS:      VENTRICLES/SULCI:  Diffuse cerebral and cerebellar atrophy. INTRACRANIAL:  No acute intracranial hemorrhage. There is no midline     shift. There is no mass effect.   There is moderate decreased attenuation Plan     Clinical Impression:  Fall at nursing home, initial encounter  (primary encounter diagnosis)  Advanced dementia [facility])     Disposition:  Discharge  9/29/2021  4:32 pm    Follow-up:  [facility] Emergency Department  [address]  [address]  [place]

## 2021-09-29 NOTE — CM/SW NOTE
CM asked to assist with POLST form. POLST form provided to family to read, waiting for MD to review with daughter Anette Wilson.      Noreen Hathaway, MSN RN, [address]  [place] [address]

## 2021-10-08 ENCOUNTER — MED REC SCAN ONLY (OUTPATIENT)
Dept: FAMILY MEDICINE CLINIC | Facility: CLINIC | Age: 86
End: 2021-10-08

## 2021-10-19 ENCOUNTER — TELEPHONE (OUTPATIENT)
Dept: FAMILY MEDICINE CLINIC | Facility: CLINIC | Age: 86
End: 2021-10-19

## 2021-11-02 ENCOUNTER — MED REC SCAN ONLY (OUTPATIENT)
Dept: FAMILY MEDICINE CLINIC | Facility: CLINIC | Age: 86
End: 2021-11-02

## 2021-11-04 ENCOUNTER — PATIENT OUTREACH (OUTPATIENT)
Dept: FAMILY MEDICINE CLINIC | Facility: CLINIC | Age: 86
End: 2021-11-04

## 2021-11-20 ENCOUNTER — TELEPHONE (OUTPATIENT)
Dept: FAMILY MEDICINE CLINIC | Facility: CLINIC | Age: 86
End: 2021-11-20

## 2021-11-20 DIAGNOSIS — G30.9 ALZHEIMER'S DEMENTIA WITH BEHAVIORAL DISTURBANCE, UNSPECIFIED TIMING OF DEMENTIA ONSET (HCC): ICD-10-CM

## 2021-11-20 DIAGNOSIS — I10 ESSENTIAL HYPERTENSION: ICD-10-CM

## 2021-11-20 DIAGNOSIS — F02.81 ALZHEIMER'S DEMENTIA WITH BEHAVIORAL DISTURBANCE, UNSPECIFIED TIMING OF DEMENTIA ONSET (HCC): ICD-10-CM

## 2021-11-20 DIAGNOSIS — D51.0 ANEMIA, PERNICIOUS: Primary | ICD-10-CM

## 2021-11-20 DIAGNOSIS — E87.6 HYPOKALEMIA: ICD-10-CM

## 2021-11-20 NOTE — TELEPHONE ENCOUNTER
Rody's daughter Storm Brand states Geovanna Macdonald is currently residing a [facility] in [facility] and is receiving poor nursing care. Her mom's baseline weight is between 135-145lbs and now she weighs 115lbs.  She does not think they are feeding her well or encourag

## 2021-11-22 NOTE — TELEPHONE ENCOUNTER
Orders signed and faxed. Daughter notified. Daughter verbalizes we should be expecting a call from Denise Ritchie RN [facility] [address] Geraldine Winn made aware.

## 2021-11-30 ENCOUNTER — TELEPHONE (OUTPATIENT)
Dept: FAMILY MEDICINE CLINIC | Facility: CLINIC | Age: 86
End: 2021-11-30

## 2021-11-30 NOTE — TELEPHONE ENCOUNTER
Daughter isn't aware that she has ever seen a hematologist; if B12 level low she would like to pursue treatment. If treatment is easy then is wanting it done; obviously if came down with cancer or something like that, she would do no treatment.   Asks if g

## 2021-12-01 NOTE — TELEPHONE ENCOUNTER
Lab results reviewed by Dr. Preston Neal. He states the following \"CBC stable, B12/Folic Acid stable. Continue same meds and recheck same labs in 4 months\". Detailed message left with daughter and Hollie Xie. Call placed to [facility] with no answer.  Orders fa

## 2021-12-03 ENCOUNTER — TELEPHONE (OUTPATIENT)
Dept: FAMILY MEDICINE CLINIC | Facility: CLINIC | Age: 86
End: 2021-12-03

## 2021-12-03 NOTE — TELEPHONE ENCOUNTER
They received 2 different sets of orders- standing order q 3 months for CBC,CMP, and Vitamin B12. Now received order to recheck in 4 months; which is it? Daughter wants every 3 months. Please advise.     Also, resident has irritation redness in gale ar

## 2021-12-07 NOTE — TELEPHONE ENCOUNTER
Asking if Dr. Mi Faulkner will be ok with moving forward with hospice.  Please fax order to: [phone]

## 2021-12-21 ENCOUNTER — TELEPHONE (OUTPATIENT)
Dept: FAMILY MEDICINE CLINIC | Facility: CLINIC | Age: 86
End: 2021-12-21

## 2021-12-21 DIAGNOSIS — I10 ESSENTIAL HYPERTENSION: ICD-10-CM

## 2021-12-21 NOTE — TELEPHONE ENCOUNTER
Daughter asking if doctor would submit lower tier request to insurance again for her liquid potassium; they charge $54.75 per month for liquid ; insurance only covers $4.00.  meds come from [facility].      We only have front side of insurance card, daughter

## 2021-12-22 ENCOUNTER — PATIENT MESSAGE (OUTPATIENT)
Dept: FAMILY MEDICINE CLINIC | Facility: CLINIC | Age: 86
End: 2021-12-22

## 2021-12-22 NOTE — TELEPHONE ENCOUNTER
From: Alba Carr  To: Lynsey Garza DO  Sent: 12/22/2021 9:14 AM CST  Subject: Mom’s feet    Dr Cameron Breeding:    I bought Mom new shoes about a month ago and when I tried to try them on her, she yelled out in pain.  I took her socks off to look and found both

## 2021-12-22 NOTE — TELEPHONE ENCOUNTER
From: Prieto Mata  To: Loretta Medina DO  Sent: 12/22/2021 9:14 AM CST  Subject: Mom’s feet    Dr Lisa Yun:    I bought Mom new shoes about a month ago and when I tried to try them on her, she yelled out in pain.  I took her socks off to look and found both

## 2022-01-11 NOTE — TELEPHONE ENCOUNTER
Speaking to [facility] at [facility]- asking for lower Tierexception for liquid potassium prescription. Pending PA for lower Tier PA# [id number]. Will receive decision in 48 to 72 hours.

## 2022-01-17 ENCOUNTER — TELEPHONE (OUTPATIENT)
Dept: FAMILY MEDICINE CLINIC | Facility: CLINIC | Age: 87
End: 2022-01-17

## 2022-01-22 ENCOUNTER — TELEPHONE (OUTPATIENT)
Dept: FAMILY MEDICINE CLINIC | Facility: CLINIC | Age: 87
End: 2022-01-22

## 2022-01-22 DIAGNOSIS — E87.6 HYPOKALEMIA: ICD-10-CM

## 2022-01-22 RX ORDER — ARIPIPRAZOLE 15 MG/1
10 TABLET ORAL DAILY
Refills: 0 | COMMUNITY
Start: 2022-01-22

## 2022-01-22 NOTE — TELEPHONE ENCOUNTER
Discussed with daughter that insurance denied lowering tier cost for liquid potassium. Advised- new orders-  Discontinue potassium medication for 2 weeks. In 2 weeks draw blood for electrolye panel. Please feed patient half of banana daily.   V/o Dr Theodore Tomas

## 2022-01-22 NOTE — TELEPHONE ENCOUNTER
Baldev Nguyen is calling she received a letter from [employer] saying that Rody's potassium chloride 40 meq/30 ml (10%) Oral Solution was not approved please call.

## 2022-01-24 ENCOUNTER — MED REC SCAN ONLY (OUTPATIENT)
Dept: FAMILY MEDICINE CLINIC | Facility: CLINIC | Age: 87
End: 2022-01-24

## 2022-01-28 ENCOUNTER — TELEPHONE (OUTPATIENT)
Dept: FAMILY MEDICINE CLINIC | Facility: CLINIC | Age: 87
End: 2022-01-28

## 2022-01-28 NOTE — TELEPHONE ENCOUNTER
New script sent and daughter notified and verbalized understanding.  Order faxed to [facility]

## 2022-01-28 NOTE — TELEPHONE ENCOUNTER
Received a fax from patient's dentist, Dr. Penny Jacobo at [facility] stating the following \"we saw Pankaj Atwood at our dental office at [facility] on 1/27/22. Euell Melena present with left side pain.  A quick intra oral

## 2022-01-28 NOTE — TELEPHONE ENCOUNTER
Daughter has questions re: antibiotic that was called over to [facility]. She is concerned her mom has allergic to penicillin.

## 2022-01-28 NOTE — TELEPHONE ENCOUNTER
No penicillin. No Amoxicillin. Appears the patient is allergic to. Can give Cefzil 250 per 5 mL 1 teaspoon twice daily x10 days. Question regarding antibiotics would be between her and the dentist.  I did not see the patient.

## 2022-03-03 LAB
AMB EXT BUN: 22 MG/DL
AMB EXT CALCIUM: 9.5
AMB EXT CARBON DIOXIDE: 30
AMB EXT CHLORIDE: 104
AMB EXT CREATININE: 0.85 MG/DL
AMB EXT GLUCOSE: 69 MG/DL
AMB EXT HEMATOCRIT: 33.9
AMB EXT HEMOGLOBIN: 11.3
AMB EXT MCV: 94.3
AMB EXT POSTASSIUM: 4.9 MMOL/L
AMB EXT SODIUM: 139 MMOL/L
AMB EXT WBC: 6.7 X10(3)UL

## 2022-03-17 ENCOUNTER — TELEPHONE (OUTPATIENT)
Dept: FAMILY MEDICINE CLINIC | Facility: CLINIC | Age: 87
End: 2022-03-17

## 2022-03-17 LAB — VITAMIN B12: 449 (ref 180–914)

## 2022-03-17 NOTE — TELEPHONE ENCOUNTER
Daughter notified of labs being stable, her K+ is 4.9;  Vitamin B12 is 449.0  Hgb 11.3.  ; continue same meds recheck in 3 months. Told daughter when told Lore Rodriguez RN that has standing order for lab every 3 months. She comments patient is on Hospice. Daughter will take care of it. Also, daughter reports about 3 to 4 weeks ago her Mother appeared to have 3 grand mal seizures, she was out of it till 6pm; then woke up; has been fine ever since. She has no history of seizures. Dr Ai Stevens made aware.

## 2022-03-17 NOTE — TELEPHONE ENCOUNTER
Labs reviewed by Dr Adela Madsen- cbc,cmp,& Vitamin B12- lab results stable recheck in 3 months (per daughter's request)

## 2022-03-18 ENCOUNTER — MED REC SCAN ONLY (OUTPATIENT)
Dept: FAMILY MEDICINE CLINIC | Facility: CLINIC | Age: 87
End: 2022-03-18

## 2022-05-02 ENCOUNTER — MED REC SCAN ONLY (OUTPATIENT)
Dept: FAMILY MEDICINE CLINIC | Facility: CLINIC | Age: 87
End: 2022-05-02

## 2022-05-10 ENCOUNTER — TELEPHONE (OUTPATIENT)
Dept: FAMILY MEDICINE CLINIC | Facility: CLINIC | Age: 87
End: 2022-05-10

## 2022-05-10 NOTE — TELEPHONE ENCOUNTER
Patient is comfort care only and is bed ridden may discontinue all medications. Not sure by nurses assessment what the patient may still need.

## 2022-05-11 NOTE — TELEPHONE ENCOUNTER
Spoke with Ricarda Bourgeois and she verbalized understanding. She states daughter still wants her on some medications, so Diane Mahan will go over with her what she is comfortable with her discontinuing.  Daughter was ok with her discontinuing the Vitamin C, Zinc.

## 2022-07-29 ENCOUNTER — MED REC SCAN ONLY (OUTPATIENT)
Dept: FAMILY MEDICINE CLINIC | Facility: CLINIC | Age: 87
End: 2022-07-29

## 2022-11-08 ENCOUNTER — TELEPHONE (OUTPATIENT)
Dept: FAMILY MEDICINE CLINIC | Facility: CLINIC | Age: 87
End: 2022-11-08

## 2022-11-08 NOTE — TELEPHONE ENCOUNTER
Put on Hospice. She got last vaccine in June, and she didn't get covid when everyone else got it in her unit. They are offering vaccine now, and directed to call physician for his recommendation. Should she get Covid vaccine?

## 2022-12-05 ENCOUNTER — TELEPHONE (OUTPATIENT)
Dept: FAMILY MEDICINE CLINIC | Facility: CLINIC | Age: 87
End: 2022-12-05

## 2022-12-05 NOTE — TELEPHONE ENCOUNTER
PLEASE CALL REGARDING NEW ORDER FOR HOSPICE-SHE SENT FAX THIS MORNING-    FAX# [phone]    Ernestine Khan

## 2022-12-06 NOTE — TELEPHONE ENCOUNTER
Order faxed for Hospice physician to manage care.   Last time Dr Leeanne De La Cruz has seen patient is June 2020

## 2023-01-11 ENCOUNTER — TELEPHONE (OUTPATIENT)
Dept: FAMILY MEDICINE CLINIC | Facility: CLINIC | Age: 88
End: 2023-01-11

## 2023-01-11 NOTE — TELEPHONE ENCOUNTER
As of 12/5/2022 [address] physician is managing her care; orders need to go to their physician. Faxed unsigned orders back to Joshua/darinel at [phone].

## (undated) DIAGNOSIS — N18.31 STAGE 3A CHRONIC KIDNEY DISEASE (HCC): ICD-10-CM

## (undated) DIAGNOSIS — I10 ESSENTIAL HYPERTENSION: ICD-10-CM

## (undated) DIAGNOSIS — I10 PRIMARY HYPERTENSION: ICD-10-CM

## (undated) DIAGNOSIS — D51.0 ANEMIA, PERNICIOUS: ICD-10-CM

## (undated) DIAGNOSIS — D51.0 PA (PERNICIOUS ANEMIA): ICD-10-CM

## (undated) DIAGNOSIS — E87.6 HYPOKALEMIA: Primary | ICD-10-CM

## (undated) NOTE — ED AVS SNAPSHOT
Jaycob Ho   MRN: [id number]    Department:  [facility] Emergency Department   Date of Visit:  7/15/2018           Disclosure     Insurance plans vary and the physician(s) referred by the ER may not be covered by your plan.  Please contact you tell this physician (or your personal doctor if your instructions are to return to your personal doctor) about any new or lasting problems. The primary care or specialist physician will see patients referred from the [facility] Emergency Department.  Chris Estrada

## (undated) NOTE — LETTER
22    Verita Galeazzi  : 4/3/1935    Patient is allergic to Penicillin. Please discontinue medication for Amoxicillin. Please start Cefzil 250 per 5 mL. 1 teaspoon twice daily x 10 days.         Marielena De Guzman DO

## (undated) NOTE — LETTER
22    Patsi Shield  : 4/3/1935    Amoxicillin 250/5 3 tsp PO BID x 10 days  Orapred 15/5 1 tsp PO BID x 5 days        Jenna Shin DO